# Patient Record
Sex: FEMALE | Race: WHITE | HISPANIC OR LATINO | URBAN - METROPOLITAN AREA
[De-identification: names, ages, dates, MRNs, and addresses within clinical notes are randomized per-mention and may not be internally consistent; named-entity substitution may affect disease eponyms.]

---

## 2019-09-24 ENCOUNTER — OFFICE VISIT (OUTPATIENT)
Dept: OBGYN CLINIC | Facility: CLINIC | Age: 34
End: 2019-09-24

## 2019-09-24 VITALS — WEIGHT: 270 LBS | HEART RATE: 62 BPM | SYSTOLIC BLOOD PRESSURE: 119 MMHG | DIASTOLIC BLOOD PRESSURE: 81 MMHG

## 2019-09-24 DIAGNOSIS — Z01.419 ENCOUNTER FOR ANNUAL ROUTINE GYNECOLOGICAL EXAMINATION: Primary | ICD-10-CM

## 2019-09-24 PROCEDURE — G0145 SCR C/V CYTO,THINLAYER,RESCR: HCPCS | Performed by: NURSE PRACTITIONER

## 2019-09-24 PROCEDURE — 87624 HPV HI-RISK TYP POOLED RSLT: CPT | Performed by: NURSE PRACTITIONER

## 2019-09-24 PROCEDURE — 99385 PREV VISIT NEW AGE 18-39: CPT | Performed by: NURSE PRACTITIONER

## 2019-09-24 NOTE — PROGRESS NOTES
Annual Exam    Assessment   1  Encounter for annual routine gynecological examination       well woman       Plan       All questions answered  Breast self exam technique reviewed and patient encouraged to perform self-exam monthly  Contraception: none  Discussed healthy lifestyle modifications  Follow up in 1 year  Thin prep Pap smear  Patient Instructions   PAP results will be available in 2 weeks  Call with needs or concerns  Return in 1 year or with positi testve pregnacy  Pt verbalized understanding of all discussed  Guadalupe Alberto is a 36 y o   female who presents for annual well woman exam  Periods are regular every 28-30 days, lasting 7 days  No intermenstrual bleeding, spotting, or discharge  1 partner x year, denies domestic   Desires pregnancy  Partner has a 2 year, pt had a miscarriage in   Discussed timing of intercourse, folic acid intake and use of an ovulation kit if desires  Pt has recently lost 40 lbs  Plans to call if she does not achieve a pregnancy in 6 months    Current contraception: none  History of abnormal Pap smear: yes - Unsure what it was in [place] 1 year ago  Family history of uterine or ovarian cancer: no  Regular self breast exam: yes  History of abnormal mammogram: N/A  Family history of breast cancer: no  History of abnormal lipids: Unknown  Menstrual History:  OB History        1    Para        Term                AB   1    Living           SAB   1    TAB        Ectopic        Multiple        Live Births                    Menarche age: 9  Patient's last menstrual period was 2019 (approximate)  The following portions of the patient's history were reviewed and updated as appropriate: allergies, current medications, past family history, past medical history, past social history, past surgical history and problem list     Review of Systems  Pertinent items are noted in HPI        Objective      /81   Pulse 62 Wt 122 kg (270 lb)   LMP 08/21/2019 (Approximate)   Breastfeeding?  No     General: alert and oriented, in no acute distress, alert, appears stated age and cooperative   Heart: regular rate and rhythm, S1, S2 normal, no murmur, click, rub or gallop   Lungs: clear to auscultation bilaterally   Thyroid: Negative masses   Abdomen: soft, non-tender, without masses or organomegaly   Vulva: normal   Vagina: normal mucosa   Cervix: no bleeding following Pap, no cervical motion tenderness and no lesions   Uterus: normal size, non-tender, normal shape and consistency   Adnexa: normal adnexa   Urethra: normal   Breasts: NT,negative masses, discharge, or dimpling

## 2019-09-24 NOTE — PATIENT INSTRUCTIONS
PAP results will be available in 2 weeks  Call with needs or concerns  Return in 1 year or with positi testve pregnacy

## 2019-09-25 LAB
HPV HR 12 DNA CVX QL NAA+PROBE: NEGATIVE
HPV16 DNA CVX QL NAA+PROBE: NEGATIVE
HPV18 DNA CVX QL NAA+PROBE: NEGATIVE

## 2019-09-27 LAB
LAB AP GYN PRIMARY INTERPRETATION: NORMAL
Lab: NORMAL

## 2021-01-25 ENCOUNTER — TELEPHONE (OUTPATIENT)
Dept: OBGYN CLINIC | Facility: CLINIC | Age: 36
End: 2021-01-25

## 2021-01-26 ENCOUNTER — TELEPHONE (OUTPATIENT)
Dept: OBGYN CLINIC | Facility: CLINIC | Age: 36
End: 2021-01-26

## 2021-04-08 ENCOUNTER — HOSPITAL ENCOUNTER (EMERGENCY)
Facility: HOSPITAL | Age: 36
Discharge: HOME | End: 2021-04-08
Attending: EMERGENCY MEDICINE
Payer: COMMERCIAL

## 2021-04-08 VITALS
DIASTOLIC BLOOD PRESSURE: 70 MMHG | HEART RATE: 60 BPM | RESPIRATION RATE: 18 BRPM | OXYGEN SATURATION: 97 % | SYSTOLIC BLOOD PRESSURE: 122 MMHG | TEMPERATURE: 97.4 F

## 2021-04-08 DIAGNOSIS — R07.89 CHEST WALL PAIN: Primary | ICD-10-CM

## 2021-04-08 LAB
ANION GAP SERPL CALCULATED.3IONS-SCNC: 7 MMOL/L (ref 4–13)
ATRIAL RATE: 70 BPM
BASOPHILS # BLD AUTO: 0.04 THOUSANDS/ΜL (ref 0–0.1)
BASOPHILS NFR BLD AUTO: 1 % (ref 0–1)
BUN SERPL-MCNC: 17 MG/DL (ref 5–25)
CALCIUM SERPL-MCNC: 9 MG/DL (ref 8.3–10.1)
CHLORIDE SERPL-SCNC: 104 MMOL/L (ref 100–108)
CO2 SERPL-SCNC: 30 MMOL/L (ref 21–32)
CREAT SERPL-MCNC: 0.84 MG/DL (ref 0.6–1.3)
EOSINOPHIL # BLD AUTO: 0.12 THOUSAND/ΜL (ref 0–0.61)
EOSINOPHIL NFR BLD AUTO: 2 % (ref 0–6)
ERYTHROCYTE [DISTWIDTH] IN BLOOD BY AUTOMATED COUNT: 12.3 % (ref 11.6–15.1)
GFR SERPL CREATININE-BSD FRML MDRD: 90 ML/MIN/1.73SQ M
GLUCOSE SERPL-MCNC: 88 MG/DL (ref 65–140)
HCT VFR BLD AUTO: 42.3 % (ref 34.8–46.1)
HGB BLD-MCNC: 13.5 G/DL (ref 11.5–15.4)
IMM GRANULOCYTES # BLD AUTO: 0.01 THOUSAND/UL (ref 0–0.2)
IMM GRANULOCYTES NFR BLD AUTO: 0 % (ref 0–2)
LYMPHOCYTES # BLD AUTO: 2.8 THOUSANDS/ΜL (ref 0.6–4.47)
LYMPHOCYTES NFR BLD AUTO: 41 % (ref 14–44)
MCH RBC QN AUTO: 28.7 PG (ref 26.8–34.3)
MCHC RBC AUTO-ENTMCNC: 31.9 G/DL (ref 31.4–37.4)
MCV RBC AUTO: 90 FL (ref 82–98)
MONOCYTES # BLD AUTO: 0.56 THOUSAND/ΜL (ref 0.17–1.22)
MONOCYTES NFR BLD AUTO: 8 % (ref 4–12)
NEUTROPHILS # BLD AUTO: 3.35 THOUSANDS/ΜL (ref 1.85–7.62)
NEUTS SEG NFR BLD AUTO: 48 % (ref 43–75)
NRBC BLD AUTO-RTO: 0 /100 WBCS
P AXIS: 38 DEGREES
PLATELET # BLD AUTO: 231 THOUSANDS/UL (ref 149–390)
PMV BLD AUTO: 10.7 FL (ref 8.9–12.7)
POTASSIUM SERPL-SCNC: 4 MMOL/L (ref 3.5–5.3)
PR INTERVAL: 142 MS
QRS AXIS: 30 DEGREES
QRSD INTERVAL: 72 MS
QT INTERVAL: 396 MS
QTC INTERVAL: 427 MS
RBC # BLD AUTO: 4.71 MILLION/UL (ref 3.81–5.12)
SODIUM SERPL-SCNC: 141 MMOL/L (ref 136–145)
T WAVE AXIS: 15 DEGREES
TROPONIN I SERPL-MCNC: <0.02 NG/ML
VENTRICULAR RATE: 70 BPM
WBC # BLD AUTO: 6.88 THOUSAND/UL (ref 4.31–10.16)

## 2021-04-08 PROCEDURE — 80048 BASIC METABOLIC PNL TOTAL CA: CPT | Performed by: PHYSICIAN ASSISTANT

## 2021-04-08 PROCEDURE — 99285 EMERGENCY DEPT VISIT HI MDM: CPT | Performed by: PHYSICIAN ASSISTANT

## 2021-04-08 PROCEDURE — 99285 EMERGENCY DEPT VISIT HI MDM: CPT

## 2021-04-08 PROCEDURE — 36415 COLL VENOUS BLD VENIPUNCTURE: CPT | Performed by: PHYSICIAN ASSISTANT

## 2021-04-08 PROCEDURE — 84484 ASSAY OF TROPONIN QUANT: CPT | Performed by: PHYSICIAN ASSISTANT

## 2021-04-08 PROCEDURE — 93010 ELECTROCARDIOGRAM REPORT: CPT | Performed by: INTERNAL MEDICINE

## 2021-04-08 PROCEDURE — 93005 ELECTROCARDIOGRAM TRACING: CPT

## 2021-04-08 PROCEDURE — 85025 COMPLETE CBC W/AUTO DIFF WBC: CPT | Performed by: PHYSICIAN ASSISTANT

## 2021-04-08 PROCEDURE — 96372 THER/PROPH/DIAG INJ SC/IM: CPT

## 2021-04-08 RX ORDER — METHOCARBAMOL 750 MG/1
750 TABLET, FILM COATED ORAL ONCE
Status: COMPLETED | OUTPATIENT
Start: 2021-04-08 | End: 2021-04-08

## 2021-04-08 RX ORDER — METHOCARBAMOL 500 MG/1
500 TABLET, FILM COATED ORAL 2 TIMES DAILY
Qty: 10 TABLET | Refills: 0 | Status: SHIPPED | OUTPATIENT
Start: 2021-04-08 | End: 2021-04-13

## 2021-04-08 RX ORDER — KETOROLAC TROMETHAMINE 30 MG/ML
30 INJECTION, SOLUTION INTRAMUSCULAR; INTRAVENOUS ONCE
Status: COMPLETED | OUTPATIENT
Start: 2021-04-08 | End: 2021-04-08

## 2021-04-08 RX ORDER — LIDOCAINE 50 MG/G
1 PATCH TOPICAL ONCE
Status: DISCONTINUED | OUTPATIENT
Start: 2021-04-08 | End: 2021-04-08 | Stop reason: HOSPADM

## 2021-04-08 RX ADMIN — KETOROLAC TROMETHAMINE 30 MG: 30 INJECTION, SOLUTION INTRAMUSCULAR; INTRAVENOUS at 02:15

## 2021-04-08 RX ADMIN — LIDOCAINE 1 PATCH: 50 PATCH TOPICAL at 02:17

## 2021-04-08 RX ADMIN — METHOCARBAMOL 750 MG: 750 TABLET, FILM COATED ORAL at 02:14

## 2021-04-08 NOTE — ED PROVIDER NOTES
History  Chief Complaint   Patient presents with    Chest Pain     pt reports chest tightness that radiates to back, started last night  states she also has pain/numbness in left arm  states last period was 38      44-year-old otherwise healthy female who presents to the emergency department for complaint of chest tightness starting approximately 3:00PM   Patient denies experiencing this pain before  She describes the pain as a tightness, localizes pain to the left side chest, radiating to the left shoulder, arm, and back, constant, worse with movements and turning over in bed, then becoming worse around 5PM while at work (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), took Tylenol with minimal relief  Denies fever or chills, pleuritic component, shortness of breath, abdominal pain, nausea vomiting, dizziness, near-syncope or syncope  Denies any past or present history of IV drug abuse, new exercise, chest trauma  No history of DVT or PE, recent long travel by car plane, recent surgery or immobilization, combination OCPs, unilateral calf pain, unilateral calf swelling or skin color changes  None       Past Medical History:   Diagnosis Date    Asthma     Depression        History reviewed  No pertinent surgical history  Family History   Problem Relation Age of Onset    Diabetes Mother     Hypertension Mother     Diabetes Father     Hypertension Father     Hyperlipidemia Father     Cancer Maternal Aunt     Cancer Maternal Uncle      I have reviewed and agree with the history as documented  E-Cigarette/Vaping     E-Cigarette/Vaping Substances     Social History     Tobacco Use    Smoking status: Never Smoker    Smokeless tobacco: Never Used   Substance Use Topics    Alcohol use: Yes     Frequency: Monthly or less     Drinks per session: 1 or 2     Binge frequency: Less than monthly    Drug use: Never       Review of Systems   Constitutional: Negative for chills, fatigue and fever  HENT: Negative for congestion, postnasal drip, rhinorrhea and sore throat  Respiratory: Negative for cough, chest tightness, shortness of breath and wheezing  Cardiovascular: Positive for chest pain  Negative for palpitations and leg swelling  Gastrointestinal: Negative for abdominal distention, abdominal pain, nausea and vomiting  Musculoskeletal: Positive for back pain  Negative for neck pain and neck stiffness  Skin: Negative for color change and rash  Neurological: Negative for dizziness, syncope, weakness, light-headedness, numbness and headaches  Hematological: Negative for adenopathy  All other systems reviewed and are negative  Physical Exam  Physical Exam  Vitals signs reviewed  Constitutional:       General: She is awake  She is not in acute distress  Appearance: Normal appearance  She is well-developed  She is not ill-appearing or toxic-appearing  HENT:      Head: Normocephalic and atraumatic  Mouth/Throat:      Lips: Pink  Mouth: Mucous membranes are moist       Pharynx: Oropharynx is clear  Uvula midline  Eyes:      Extraocular Movements: Extraocular movements intact  Conjunctiva/sclera: Conjunctivae normal       Pupils: Pupils are equal, round, and reactive to light  Neck:      Musculoskeletal: Normal range of motion and neck supple  Cardiovascular:      Rate and Rhythm: Normal rate and regular rhythm  Pulses: Normal pulses  Pulmonary:      Effort: Pulmonary effort is normal       Breath sounds: Normal breath sounds  Chest:      Chest wall: Tenderness present  No mass, deformity, swelling, crepitus or edema  Abdominal:      General: Bowel sounds are normal  There is no distension  Palpations: Abdomen is soft  Tenderness: There is no abdominal tenderness  Musculoskeletal: Normal range of motion  Skin:     General: Skin is warm  Capillary Refill: Capillary refill takes less than 2 seconds        Findings: No erythema, lesion or rash  Neurological:      Mental Status: She is alert and oriented to person, place, and time  Psychiatric:         Behavior: Behavior is cooperative           Vital Signs  ED Triage Vitals   Temperature Pulse Respirations Blood Pressure SpO2   04/08/21 0115 04/08/21 0112 04/08/21 0112 04/08/21 0112 04/08/21 0112   (!) 97 4 °F (36 3 °C) 69 18 156/88 97 %      Temp Source Heart Rate Source Patient Position - Orthostatic VS BP Location FiO2 (%)   04/08/21 0115 04/08/21 0112 04/08/21 0112 04/08/21 0112 --   Oral Monitor Sitting Left arm       Pain Score       04/08/21 0112       9           Vitals:    04/08/21 0112 04/08/21 0302   BP: 156/88 122/70   Pulse: 69 60   Patient Position - Orthostatic VS: Sitting Lying         Visual Acuity      ED Medications  Medications   ketorolac (TORADOL) injection 30 mg (30 mg Intramuscular Given 4/8/21 0215)   methocarbamol (ROBAXIN) tablet 750 mg (750 mg Oral Given 4/8/21 0214)       Diagnostic Studies  Results Reviewed     Procedure Component Value Units Date/Time    Troponin I [[id number]]  (Normal) Collected: 04/08/21 0222    Lab Status: Final result Specimen: Blood from Arm, Left Updated: 04/08/21 0249     Troponin I <0 02 ng/mL     Basic metabolic panel [[id number]] Collected: 04/08/21 0222    Lab Status: Final result Specimen: Blood from Arm, Left Updated: 04/08/21 0247     Sodium 141 mmol/L      Potassium 4 0 mmol/L      Chloride 104 mmol/L      CO2 30 mmol/L      ANION GAP 7 mmol/L      BUN 17 mg/dL      Creatinine 0 84 mg/dL      Glucose 88 mg/dL      Calcium 9 0 mg/dL      eGFR 90 ml/min/1 73sq m     Narrative:      Sisi guidelines for Chronic Kidney Disease (CKD):     Stage 1 with normal or high GFR (GFR > 90 mL/min/1 73 square meters)    Stage 2 Mild CKD (GFR = 60-89 mL/min/1 73 square meters)    Stage 3A Moderate CKD (GFR = 45-59 mL/min/1 73 square meters)    Stage 3B Moderate CKD (GFR = 30-44 mL/min/1 73 square meters)   Stage 4 Severe CKD (GFR = 15-29 mL/min/1 73 square meters)    Stage 5 End Stage CKD (GFR <15 mL/min/1 73 square meters)  Note: GFR calculation is accurate only with a steady state creatinine    CBC and differential [[id number]] Collected: 04/08/21 0222    Lab Status: Final result Specimen: Blood from Arm, Left Updated: 04/08/21 0230     WBC 6 88 Thousand/uL      RBC 4 71 Million/uL      Hemoglobin 13 5 g/dL      Hematocrit 42 3 %      MCV 90 fL      MCH 28 7 pg      MCHC 31 9 g/dL      RDW 12 3 %      MPV 10 7 fL      Platelets 045 Thousands/uL      nRBC 0 /100 WBCs      Neutrophils Relative 48 %      Immat GRANS % 0 %      Lymphocytes Relative 41 %      Monocytes Relative 8 %      Eosinophils Relative 2 %      Basophils Relative 1 %      Neutrophils Absolute 3 35 Thousands/µL      Immature Grans Absolute 0 01 Thousand/uL      Lymphocytes Absolute 2 80 Thousands/µL      Monocytes Absolute 0 56 Thousand/µL      Eosinophils Absolute 0 12 Thousand/µL      Basophils Absolute 0 04 Thousands/µL                  No orders to display              Procedures  Procedures         ED Course  ED Course as of Apr 08 0608   Thu Apr 08, 2021   0134 EKG shows normal sinus rhythm, rate 70, no acute ST segment elevation or depression, T-wave inversion in lead III, , QRS duration 72, peer interval 142, no arrhythmia or heart block      0301 Workup unremarkable  Discussed supportive care measures for musculoskeletal chest wall pain including anti-inflammatories p r n , Robaxin p r n , heating pad, rest and avoidance of any aggravating activities                  HEART Risk Score      Most Recent Value   Heart Score Risk Calculator   History  0 Filed at: 04/08/2021 0256   ECG  0 Filed at: 04/08/2021 0256   Age  0 Filed at: 04/08/2021 0256   Risk Factors  0 Filed at: 04/08/2021 0256   Troponin  0 Filed at: 04/08/2021 0256   HEART Score  0 Filed at: 04/08/2021 0256                      SBIRT 22yo+      Most Recent Value   SBIRT (21 yo +)   In order to provide better care to our patients, we are screening all of our patients for alcohol and drug use  Would it be okay to ask you these screening questions? Yes Filed at: 04/08/2021 0123   Initial Alcohol Screen: US AUDIT-C    1  How often do you have a drink containing alcohol? 2 Filed at: 04/08/2021 0123   2  How many drinks containing alcohol do you have on a typical day you are drinking? 1 Filed at: 04/08/2021 0123   3b  FEMALE Any Age, or MALE 65+: How often do you have 4 or more drinks on one occassion? 0 Filed at: 04/08/2021 0123   Audit-C Score  3 Filed at: 04/08/2021 2516   ALLEN: How many times in the past year have you    Used an illegal drug or used a prescription medication for non-medical reasons? Never Filed at: 04/08/2021 0123                    MDM  Number of Diagnoses or Management Options  Chest wall pain:   Diagnosis management comments: On exam, well-appearing female, no acute distress, nontoxic appearance, afebrile, vitals unremarkable, awake alert and oriented, largely producible and exquisite tenderness of the sternum and left side chest such that patient winces and withdraws from touch, lungs clear to auscultation bilaterally, no heart murmur, abdomen soft and nontender, no distension, remainder of exam unremarkable as above  As pain is largely reproducible on exam and patient reports worsening pain with movement and at work, suspicion for cardiac origin of pain is low  Will check labs, EKG for any ischemic changes, arrhythmia, or block  Will administer Toradol, Robaxin, and lidocaine and reassess         Amount and/or Complexity of Data Reviewed  Clinical lab tests: ordered and reviewed  Tests in the medicine section of CPT®: ordered and reviewed  Decide to obtain previous medical records or to obtain history from someone other than the patient: yes  Obtain history from someone other than the patient: yes  Review and summarize past medical records: yes  Discuss the patient with other providers: yes    Patient Progress  Patient progress: improved (See ED course note for dispo and plan  I reviewed and discussed all lab findings with the patient at bedside  I discussed emergency department return parameters  I answered any and all questions the patient had regarding emergency department course of evaluation and treatment  The patient verbalized understanding of and agreement with plan   )      Disposition  Final diagnoses:   Chest wall pain     Time reflects when diagnosis was documented in both MDM as applicable and the Disposition within this note     Time User Action Codes Description Comment    4/8/2021  2:57 AM Jaspreet Hill Add [R07 89] Chest wall pain       ED Disposition     ED Disposition Condition Date/Time Comment    Discharge Stable Thu Apr 8, 2021  2:56 AM Yen Paulino discharge to home/self care  Follow-up Information     Follow up With Specialties Details Why Contact Info Additional [address] [facility] Emergency Department Emergency Medicine Go to  If symptoms worsen [facility] [id number]  [address] [place] [address] Emergency Department, [address] [place] [address], [address] Schedule an appointment as soon as possible for a visit in 1 day For further evaluation [facility] [id number]  [address] [place] [address], [address] [place] [address], [address] [place] [address]          Discharge Medication List as of 4/8/2021  3:01 AM      START taking these medications    Details   methocarbamol (ROBAXIN) 500 mg tablet Take 1 tablet (500 mg total) by mouth 2 (two) times a day for 5 days, Starting Thu 4/8/2021, Until Tue 4/13/2021, Normal           No discharge procedures on file      PDMP Review     None          ED Provider  Electronically Signed by           Shweta Valles DAWOOD  04/08/21 2862

## 2021-04-08 NOTE — Clinical Note
Eva Krishnamurthy was seen and treated in our emergency department on 4/8/2021  light duty    Diagnosis: chest wall pain    Serina  may return to work on return date  She may return on this date: 04/09/2021         If you have any questions or concerns, please don't hesitate to call        Andrea Cote PA-C    ______________________________           _______________          _______________  Hospital Representative                              Date                                Time

## 2022-09-04 ENCOUNTER — APPOINTMENT (OUTPATIENT)
Dept: RADIOLOGY | Facility: HOSPITAL | Age: 37
End: 2022-09-04
Payer: COMMERCIAL

## 2022-09-04 ENCOUNTER — HOSPITAL ENCOUNTER (EMERGENCY)
Facility: HOSPITAL | Age: 37
Discharge: HOME | End: 2022-09-04
Attending: EMERGENCY MEDICINE
Payer: COMMERCIAL

## 2022-09-04 VITALS
SYSTOLIC BLOOD PRESSURE: 142 MMHG | OXYGEN SATURATION: 99 % | DIASTOLIC BLOOD PRESSURE: 75 MMHG | HEIGHT: 68 IN | TEMPERATURE: 98.5 F | BODY MASS INDEX: 41.05 KG/M2 | HEART RATE: 83 BPM | RESPIRATION RATE: 18 BRPM

## 2022-09-04 DIAGNOSIS — M54.6 ACUTE MIDLINE THORACIC BACK PAIN: Primary | ICD-10-CM

## 2022-09-04 PROCEDURE — 99284 EMERGENCY DEPT VISIT MOD MDM: CPT | Performed by: EMERGENCY MEDICINE

## 2022-09-04 PROCEDURE — 72070 X-RAY EXAM THORAC SPINE 2VWS: CPT

## 2022-09-04 PROCEDURE — 72100 X-RAY EXAM L-S SPINE 2/3 VWS: CPT

## 2022-09-04 PROCEDURE — 99283 EMERGENCY DEPT VISIT LOW MDM: CPT

## 2022-09-04 NOTE — Clinical Note
Jaja Amaya was seen and treated in our emergency department on 9/4/2022  No restrictions            Diagnosis:     Gertrudis Monge  may return to work on return date  She may return on this date: 09/06/2022         If you have any questions or concerns, please don't hesitate to call        Lewis Lubin MD    ______________________________           _______________          _______________  Hospital Representative                              Date                                Time

## 2022-09-05 NOTE — ED PROVIDER NOTES
History  Chief Complaint   Patient presents with    Back Pain     Patient reports pain to middle of back for 2 weeks, occasional radiation of pain to both upper and lower back  Reports fall at work 1 month ago  76-year-old female, presenting with back pain  Patient reports pain in mid back for the past 2 weeks  Pain worse with movement and radiates down back in to bilateral legs  Patient states she fell several weeks ago at work, unsure she injured at that time  Denies any weakness in legs, no fevers, no difficulty with urination or bowel movements  No shortness of breath or chest pain  History provided by:  Patient   used: No    Back Pain  Associated symptoms: no abdominal pain and no fever        Prior to Admission Medications   Prescriptions Last Dose Informant Patient Reported? Taking? methocarbamol (ROBAXIN) 500 mg tablet   No No   Sig: Take 1 tablet (500 mg total) by mouth 2 (two) times a day for 5 days      Facility-Administered Medications: None       Past Medical History:   Diagnosis Date    Asthma     Depression        Past Surgical History:   Procedure Laterality Date    SLEEVE GASTROPLASTY         Family History   Problem Relation Age of Onset    Diabetes Mother     Hypertension Mother     Diabetes Father     Hypertension Father     Hyperlipidemia Father     Cancer Maternal Aunt     Cancer Maternal Uncle      I have reviewed and agree with the history as documented  E-Cigarette/Vaping     E-Cigarette/Vaping Substances     Social History     Tobacco Use    Smoking status: Never Smoker    Smokeless tobacco: Never Used   Substance Use Topics    Alcohol use: Yes     Comment: social    Drug use: Never       Review of Systems   Constitutional: Negative  Negative for fever  Respiratory: Negative  Cardiovascular: Negative  Gastrointestinal: Negative  Negative for abdominal pain  Genitourinary: Negative  Musculoskeletal: Positive for back pain  Neurological: Negative  Physical Exam  Physical Exam  Vitals and nursing note reviewed  Constitutional:       General: She is not in acute distress  HENT:      Head: Normocephalic and atraumatic  Cardiovascular:      Rate and Rhythm: Normal rate  Pulmonary:      Effort: Pulmonary effort is normal    Abdominal:      Palpations: Abdomen is soft  Tenderness: There is no abdominal tenderness  Musculoskeletal:         General: Normal range of motion  Back:       Comments: Tenderness to lower thoracic, upper lumbar spinal area, no swelling no deformity  Skin:     General: Skin is warm and dry  Neurological:      General: No focal deficit present  Mental Status: She is alert and oriented to person, place, and time  Motor: No weakness  Comments: 5/5 strength all 4 extremities, normal gait         Vital Signs  ED Triage Vitals [09/04/22 2201]   Temperature Pulse Respirations Blood Pressure SpO2   98 5 °F (36 9 °C) 83 18 142/75 99 %      Temp Source Heart Rate Source Patient Position - Orthostatic VS BP Location FiO2 (%)   Oral Monitor Sitting Right arm --      Pain Score       8           Vitals:    09/04/22 2201   BP: 142/75   Pulse: 83   Patient Position - Orthostatic VS: Sitting         Visual Acuity      ED Medications  Medications - No data to display    Diagnostic Studies  Results Reviewed     None                 XR thoracic spine 2 views    (Results Pending)   XR lumbar spine 2 or 3 views    (Results Pending)              Procedures  Procedures         ED Course  ED Course as of 09/04/22 2325   Sun Sep 04, 2022   2315 X-ray lumbar and thoracic spine reviewed by myself, no acute fracture, no acute findings  2325 Patient able to ambulate in ED with  Ambulatory referral for comprehensive spine PT order placed                                               MDM  Number of Diagnoses or Management Options  Diagnosis management comments: 71-year-old female, presenting with back pain  Differential diagnosis includes back strain, contusion, compression fracture among other diagnosis  X-rays ordered  Amount and/or Complexity of Data Reviewed  Tests in the radiology section of CPT®: ordered and reviewed  Independent visualization of images, tracings, or specimens: yes        Disposition  Final diagnoses:   Acute midline thoracic back pain     Time reflects when diagnosis was documented in both MDM as applicable and the Disposition within this note     Time User Action Codes Description Comment    9/4/2022 11:17 PM Katlyn Madden Add [M54 6] Acute midline thoracic back pain       ED Disposition     ED Disposition   Discharge    Condition   Stable    Date/Time   Sun Sep 4, 2022 11:17 PM    [address] [facility] discharge to home/self care                 Follow-up Information     Follow up With Specialties Details Why Contact Info Additional Information    [address] [facility]   [phone]          Discharge Medication List as of 9/4/2022 11:19 PM      CONTINUE these medications which have NOT CHANGED    Details   methocarbamol (ROBAXIN) 500 mg tablet Take 1 tablet (500 mg total) by mouth 2 (two) times a day for 5 days, Starting Thu 4/8/2021, Until Tue 4/13/2021, Normal                 PDMP Review     None          ED Provider  Electronically Signed by           Branden Henley MD  09/04/22 0083

## 2022-10-03 ENCOUNTER — HOSPITAL ENCOUNTER (EMERGENCY)
Facility: HOSPITAL | Age: 37
Discharge: HOME | End: 2022-10-03
Attending: EMERGENCY MEDICINE
Payer: COMMERCIAL

## 2022-10-03 ENCOUNTER — APPOINTMENT (OUTPATIENT)
Dept: ULTRASOUND IMAGING | Facility: HOSPITAL | Age: 37
End: 2022-10-03
Payer: COMMERCIAL

## 2022-10-03 VITALS
DIASTOLIC BLOOD PRESSURE: 72 MMHG | WEIGHT: 235.23 LBS | TEMPERATURE: 98.4 F | RESPIRATION RATE: 18 BRPM | BODY MASS INDEX: 35.77 KG/M2 | SYSTOLIC BLOOD PRESSURE: 111 MMHG | HEART RATE: 56 BPM | OXYGEN SATURATION: 100 %

## 2022-10-03 DIAGNOSIS — Z3A.01 LESS THAN 8 WEEKS GESTATION OF PREGNANCY: ICD-10-CM

## 2022-10-03 DIAGNOSIS — R10.9 ABDOMINAL CRAMPING: Primary | ICD-10-CM

## 2022-10-03 LAB
ABO GROUP BLD: NORMAL
B-HCG SERPL-ACNC: ABNORMAL MIU/ML
BILIRUB UR QL STRIP: NEGATIVE
CLARITY UR: CLEAR
COLOR UR: YELLOW
EXT PREG TEST URINE: POSITIVE
EXT. CONTROL ED NAV: ABNORMAL
GLUCOSE UR STRIP-MCNC: NEGATIVE MG/DL
HGB UR QL STRIP.AUTO: NEGATIVE
KETONES UR STRIP-MCNC: NEGATIVE MG/DL
LEUKOCYTE ESTERASE UR QL STRIP: NEGATIVE
NITRITE UR QL STRIP: NEGATIVE
PH UR STRIP.AUTO: 6 [PH]
PROT UR STRIP-MCNC: NEGATIVE MG/DL
RH BLD: POSITIVE
SP GR UR STRIP.AUTO: 1.02 (ref 1–1.03)
UROBILINOGEN UR QL STRIP.AUTO: 0.2 E.U./DL

## 2022-10-03 PROCEDURE — 81003 URINALYSIS AUTO W/O SCOPE: CPT | Performed by: EMERGENCY MEDICINE

## 2022-10-03 PROCEDURE — 86901 BLOOD TYPING SEROLOGIC RH(D): CPT | Performed by: EMERGENCY MEDICINE

## 2022-10-03 PROCEDURE — 96374 THER/PROPH/DIAG INJ IV PUSH: CPT

## 2022-10-03 PROCEDURE — 87086 URINE CULTURE/COLONY COUNT: CPT | Performed by: EMERGENCY MEDICINE

## 2022-10-03 PROCEDURE — 81025 URINE PREGNANCY TEST: CPT | Performed by: EMERGENCY MEDICINE

## 2022-10-03 PROCEDURE — 87591 N.GONORRHOEAE DNA AMP PROB: CPT | Performed by: EMERGENCY MEDICINE

## 2022-10-03 PROCEDURE — 84702 CHORIONIC GONADOTROPIN TEST: CPT | Performed by: EMERGENCY MEDICINE

## 2022-10-03 PROCEDURE — 86900 BLOOD TYPING SEROLOGIC ABO: CPT | Performed by: EMERGENCY MEDICINE

## 2022-10-03 PROCEDURE — 87491 CHLMYD TRACH DNA AMP PROBE: CPT | Performed by: EMERGENCY MEDICINE

## 2022-10-03 PROCEDURE — 76801 OB US < 14 WKS SINGLE FETUS: CPT

## 2022-10-03 PROCEDURE — 36415 COLL VENOUS BLD VENIPUNCTURE: CPT | Performed by: EMERGENCY MEDICINE

## 2022-10-03 PROCEDURE — 99282 EMERGENCY DEPT VISIT SF MDM: CPT | Performed by: EMERGENCY MEDICINE

## 2022-10-03 PROCEDURE — 99284 EMERGENCY DEPT VISIT MOD MDM: CPT

## 2022-10-03 RX ORDER — ACETAMINOPHEN 325 MG/1
650 TABLET ORAL ONCE
Status: COMPLETED | OUTPATIENT
Start: 2022-10-03 | End: 2022-10-03

## 2022-10-03 RX ORDER — ONDANSETRON 2 MG/ML
4 INJECTION INTRAMUSCULAR; INTRAVENOUS ONCE
Status: COMPLETED | OUTPATIENT
Start: 2022-10-03 | End: 2022-10-03

## 2022-10-03 RX ADMIN — ACETAMINOPHEN 650 MG: 325 TABLET ORAL at 20:58

## 2022-10-03 RX ADMIN — ONDANSETRON 4 MG: 2 INJECTION INTRAMUSCULAR; INTRAVENOUS at 20:58

## 2022-10-04 LAB
C TRACH DNA SPEC QL NAA+PROBE: NEGATIVE
N GONORRHOEA DNA SPEC QL NAA+PROBE: NEGATIVE

## 2022-10-04 NOTE — ED PROVIDER NOTES
History  Chief Complaint   Patient presents with    Vaginal Itching     Reports vaginal itching that began x3 days ago  Denies vaginal discharge  Reports burning with urination   Abdominal Cramping     Patient reports bilateral lower quadrant abdominal cramping that began yesterday  Reports worsening cramping when sitting  ~7 weeks pregnant  Denies Vaginal bleeding or discharge  HPI    41-year-old female, , 7 weeks pregnant by last known menstrual period, presenting for evaluation of dysuria and lower abdominal cramping  Dysuria started 3 days ago and is associated with some vaginal itching  Lower abdominal cramping began 1 day ago  Cannot recall any worsening or relieving factors  It is associated with nausea without vomiting  Patient has not had any OB follow-up yet, just found out that she was pregnant last week via a home pregnancy test   Admits to history of 1 spontaneous miscarriage at approximately 4 weeks  Denies vaginal bleeding, vaginal discharge, chest pain, shortness of breath, fever, chills, numbness, weakness, flank pain  Prior to Admission Medications   Prescriptions Last Dose Informant Patient Reported? Taking? methocarbamol (ROBAXIN) 500 mg tablet   No No   Sig: Take 1 tablet (500 mg total) by mouth 2 (two) times a day for 5 days      Facility-Administered Medications: None       Past Medical History:   Diagnosis Date    Asthma     Depression        Past Surgical History:   Procedure Laterality Date    SLEEVE GASTROPLASTY         Family History   Problem Relation Age of Onset    Diabetes Mother     Hypertension Mother     Diabetes Father     Hypertension Father     Hyperlipidemia Father     Cancer Maternal Aunt     Cancer Maternal Uncle      I have reviewed and agree with the history as documented      E-Cigarette/Vaping    E-Cigarette Use Never User      E-Cigarette/Vaping Substances    Nicotine No     THC No     CBD No     Flavoring No     Other No     Unknown No      Social History     Tobacco Use    Smoking status: Never Smoker    Smokeless tobacco: Never Used   Vaping Use    Vaping Use: Never used   Substance Use Topics    Alcohol use: Not Currently     Comment: social    Drug use: Never        Review of Systems   Constitutional: Negative for chills and fever  HENT: Negative for sore throat  Respiratory: Negative for cough and shortness of breath  Cardiovascular: Negative for chest pain, palpitations and leg swelling  Gastrointestinal: Positive for abdominal pain and nausea  Negative for diarrhea and vomiting  Genitourinary: Positive for dysuria  Negative for frequency, vaginal bleeding and vaginal discharge  Musculoskeletal: Negative for myalgias  Skin: Negative for rash and wound  Neurological: Negative for dizziness, light-headedness and headaches  All other systems reviewed and are negative  Physical Exam  ED Triage Vitals [10/03/22 1950]   Temperature Pulse Respirations Blood Pressure SpO2   98 4 °F (36 9 °C) 72 17 137/65 100 %      Temp Source Heart Rate Source Patient Position - Orthostatic VS BP Location FiO2 (%)   Oral Monitor Sitting Right arm --      Pain Score       7             Orthostatic Vital Signs  Vitals:    10/03/22 1950 10/03/22 2204   BP: 137/65 111/72   Pulse: 72 56   Patient Position - Orthostatic VS: Sitting Lying       Physical Exam  Vitals and nursing note reviewed  Constitutional:       General: She is not in acute distress  Appearance: Normal appearance  She is not ill-appearing or toxic-appearing  HENT:      Head: Normocephalic and atraumatic  Right Ear: External ear normal       Left Ear: External ear normal       Nose: Nose normal       Mouth/Throat:      Mouth: Mucous membranes are moist       Pharynx: Oropharynx is clear  Eyes:      General: No scleral icterus  Extraocular Movements: Extraocular movements intact     Cardiovascular:      Rate and Rhythm: Normal rate and regular rhythm  Pulses: Normal pulses  Pulmonary:      Effort: Pulmonary effort is normal  No respiratory distress  Breath sounds: Normal breath sounds  Abdominal:      Palpations: Abdomen is soft  Tenderness: There is no abdominal tenderness  There is no guarding or rebound  Musculoskeletal:         General: Normal range of motion  Cervical back: Normal range of motion and neck supple  Skin:     General: Skin is warm  Capillary Refill: Capillary refill takes less than 2 seconds  Neurological:      General: No focal deficit present  Mental Status: She is alert and oriented to person, place, and time           ED Medications  Medications   acetaminophen (TYLENOL) tablet 650 mg (650 mg Oral Given 10/3/22 2058)   ondansetron (ZOFRAN) injection 4 mg (4 mg Intravenous Given 10/3/22 2058)       Diagnostic Studies  Results Reviewed     Procedure Component Value Units Date/Time    Chlamydia/GC amplified DNA by PCR [[id number]] Collected: 10/03/22 2302    Lab Status: No result Specimen: Urine, Other     hCG, quantitative [[id number]]  (Abnormal) Collected: 10/03/22 2052    Lab Status: Final result Specimen: Blood from Arm, Right Updated: 10/03/22 2150     HCG, Quant 70,138 3 mIU/mL     Narrative:       Expected Ranges:     Approximate               Approximate HCG  Gestation age          Concentration ( mIU/mL)  _____________          ______________________   Ronny Nurse                      HCG values  0 2-1                       5-50  1-2                           2-3                         100-5000  3-4                         500-36601  4-5                         1000-88391  5-6                         [id number]  6-8                         [id number]  8-12                        [id number]      UA w Reflex to Microscopic w Reflex to Culture [[id number]] Collected: 10/03/22 2053    Lab Status: Final result Specimen: Urine, Other Updated: 10/03/22 2112     Color, UA Yellow     Clarity, UA Clear     Specific [place], UA 1 020     pH, UA 6 0     Leukocytes, UA Negative     Nitrite, UA Negative     Protein, UA Negative mg/dl      Glucose, UA Negative mg/dl      Ketones, UA Negative mg/dl      Urobilinogen, UA 0 2 E U /dl      Bilirubin, UA Negative     Occult Blood, UA Negative     URINE COMMENT --    Urine culture [[id number]] Collected: 10/03/22 2053    Lab Status: In process Specimen: Urine, Other Updated: 10/03/22 2112    POCT pregnancy, urine [[id number]]  (Abnormal) Resulted: 10/03/22 2058    Lab Status: Final result Updated: 10/03/22 2058     EXT PREG TEST UR (Ref: Negative) positive     Control valid                 US OB < 14 weeks with transvaginal   Final Result by Ariadna Dietz MD (10/03 2302)      Single live intrauterine gestation at 7 weeks 6 days (range +/- 4)  EMANUEL of 5/16/2023  Workstation performed: [id number]               Procedures  Procedures      ED Course  ED Course as of 10/03/22 2319   Mon Oct 03, 2022   2152 Rh Factor: Positive  No rhogam required  SBIRT 20yo+    Flowsheet Row Most Recent Value   SBIRT (25 yo +)    In order to provide better care to our patients, we are screening all of our patients for alcohol and drug use  Would it be okay to ask you these screening questions? No Filed at: 10/03/2022 2111                [facility]  Number of Diagnoses or Management Options  Abdominal cramping  Less than 8 weeks gestation of pregnancy  Diagnosis management comments: 69-year-old female presenting for evaluation of lower abdominal cramping, dysuria in the setting of pregnancy  Denies any vaginal discharge or vaginal bleeding  My differential diagnosis includes but is not limited to ectopic pregnancy, threatened miscarriage, UTI  Plan:  Quantitative beta HCG, UA, GC, transvaginal ultrasound  Although the patient denies vaginal bleeding, will check Rh factor  Patient's UA does not suggest infection    Transvaginal ultrasound confirmed intrauterine pregnancy  Discussed these results with the patient and her , gave anticipatory guidance, advised him to follow-up with OB and gave strict return to ED precautions  All questions were answered at this time  I reviewed all testing with the patient: see above  I gave oral return precautions for what to return for in addition to the written return precautions  The patient (and any family present: ) verbalized understanding of the discharge instructions and warnings that would necessitate return to the Emergency Department  I specifically highlighted areas of special concern regarding the written and verbal discharge instructions and return precautions  All questions were answered prior to discharge  Disposition  Final diagnoses:   Abdominal cramping   Less than 8 weeks gestation of pregnancy     Time reflects when diagnosis was documented in both MDM as applicable and the Disposition within this note     Time User Action Codes Description Comment    10/3/2022 11:05 PM Marty Mcdonald Add [R10 9] Abdominal cramping     10/3/2022 11:05 PM Marty Mcdonald Add [Z3A 01] Less than 8 weeks gestation of pregnancy       ED Disposition     ED Disposition   Discharge    Condition   Stable    Date/Time   Mon Oct 3, 2022 11:05 PM    Comment   Kailyn Manning discharge to home/self care                 Follow-up Information     Follow up With Specialties Details Why Contact Info Additional [address] Obstetrics and Gynecology  For Emergency Department Follow-up [address] [address] [place] [address], [phone]   [facility] Emergency Department Emergency Medicine  If symptoms worsen [facility] [id number]  [address] [place] [address] Emergency Department, [address] [facility] [address]  , [place], [address] [facility], [address]          Discharge Medication List as of 10/3/2022 11:06 PM      CONTINUE these medications which have NOT CHANGED    Details   methocarbamol (ROBAXIN) 500 mg tablet Take 1 tablet (500 mg total) by mouth 2 (two) times a day for 5 days, Starting Thu 4/8/2021, Until Tue 4/13/2021, Normal           No discharge procedures on file  PDMP Review     None           ED Provider  Attending physically available and evaluated Dane Davis I managed the patient along with the ED Attending      Electronically Signed by         Andrew Zhang DO  10/03/22 4914

## 2022-10-04 NOTE — ED ATTENDING ATTESTATION
10/3/2022  Marie AGUILA, DO, saw and evaluated the patient  I have discussed the patient with the resident/non-physician practitioner and agree with the resident's/non-physician practitioner's findings, Plan of Care, and MDM as documented in the resident's/non-physician practitioner's note, except where noted  All available labs and Radiology studies were reviewed  I was present for key portions of any procedure(s) performed by the resident/non-physician practitioner and I was immediately available to provide assistance  At this point I agree with the current assessment done in the Emergency Department  I have conducted an independent evaluation of this patient a history and physical is as follows:    41 yo F  7 weeks pregnant by LMP presenting with dysuria and lower abdominal cramping  Pt states dysuria started 3 days ago  B/l lower abdominal cramping started yesterday  No a/e factors  Nausea without vomiting  Denies CP, SOB, cough, URI sx, changes in stool, vaginal discharge/bleeding     Has not seen OB yet this pregnancy  H/o 1 spontaneous miscarriage at about 4 weeks    MDM: 41 yo F pregnant with abdominal cramping/dysuria- UA to r/o UTI, hcg quant, US to r/o ectopic/eval for IUP, dispo pending workup    ED Course         Critical Care Time  Procedures

## 2022-10-04 NOTE — DISCHARGE INSTRUCTIONS
Your ultrasound showed "Single live intrauterine gestation at 7 weeks 6 days (range +/- 4) "    Follow up with OB  Return to the ED if you develop any of the concerning symptoms we discussed  You can take acetaminophen 650 mg every 6 hours as needed for your cramping

## 2022-10-05 LAB — BACTERIA UR CULT: NORMAL

## 2022-11-27 ENCOUNTER — HOSPITAL ENCOUNTER (EMERGENCY)
Facility: HOSPITAL | Age: 37
Discharge: HOME | End: 2022-11-27
Attending: EMERGENCY MEDICINE

## 2022-11-27 VITALS
WEIGHT: 237.22 LBS | HEART RATE: 76 BPM | SYSTOLIC BLOOD PRESSURE: 138 MMHG | BODY MASS INDEX: 36.07 KG/M2 | DIASTOLIC BLOOD PRESSURE: 71 MMHG | RESPIRATION RATE: 18 BRPM | OXYGEN SATURATION: 100 % | TEMPERATURE: 97.9 F

## 2022-11-27 DIAGNOSIS — Z34.90 PREGNANCY: Primary | ICD-10-CM

## 2022-11-27 DIAGNOSIS — M54.10 RADICULOPATHY OF LEG: ICD-10-CM

## 2022-11-27 DIAGNOSIS — M54.50 ACUTE LOW BACK PAIN: ICD-10-CM

## 2022-11-27 RX ORDER — ACETAMINOPHEN 325 MG/1
975 TABLET ORAL ONCE
Status: COMPLETED | OUTPATIENT
Start: 2022-11-27 | End: 2022-11-27

## 2022-11-27 RX ORDER — FENTANYL CITRATE 50 UG/ML
INJECTION, SOLUTION INTRAMUSCULAR; INTRAVENOUS
Status: DISCONTINUED
Start: 2022-11-27 | End: 2022-11-27 | Stop reason: HOSPADM

## 2022-11-27 RX ORDER — ACETAMINOPHEN 500 MG
500 TABLET ORAL EVERY 6 HOURS PRN
Qty: 30 TABLET | Refills: 0 | Status: SHIPPED | OUTPATIENT
Start: 2022-11-27

## 2022-11-27 RX ADMIN — ACETAMINOPHEN 975 MG: 325 TABLET ORAL at 20:16

## 2022-11-28 NOTE — ED NOTES
This RN noticed that an Ronakn Stare overrode a dose of Fentanyl for this patient  Hugh Goddard Texted and questioned why this action was done   No answer at this time     Lorrie Norton RN  11/27/22 2001

## 2022-11-28 NOTE — ED NOTES
Mer Chin More responded through [facility] stating, "We have a labor patient here in 19 was last name Yoanna Metz  I believe I took the medicine out under the wrong Yoanna Metz " I asked if he was going to return it under my patient due to the medication incident now   He replied, "Yes "     Verna Eagle, RN  11/27/22 2019

## 2022-11-28 NOTE — ED PROVIDER NOTES
History  Chief Complaint   Patient presents with   • Back Pain     Patient reports she is having lower back pain that radiates down left leg since Friday  Denies injury/trauma  Patient is approx 15 weeks pregnant but denies all pregnancy related complaints - denies vaginal bleeding, discharge, cramping, etc         History provided by:  Patient and spouse  Back Pain  Location:  Lumbar spine (Para to the left leg and pelvis)  Radiates to: Left anterior thigh, left calf  Pain severity:  Moderate  Onset quality:  Gradual  Timing:  Constant  Progression:  Worsening  Chronicity:  New  Worsened by:  Nothing  Ineffective treatments:  None tried  Associated symptoms: leg pain    Associated symptoms: no abdominal pain, no abdominal swelling, no bladder incontinence, no bowel incontinence, no chest pain, no dysuria, no fever, no numbness, no pelvic pain, no perianal numbness and no weakness    Risk factors: pregnancy        Prior to Admission Medications   Prescriptions Last Dose Informant Patient Reported? Taking? methocarbamol (ROBAXIN) 500 mg tablet   No No   Sig: Take 1 tablet (500 mg total) by mouth 2 (two) times a day for 5 days      Facility-Administered Medications: None       Past Medical History:   Diagnosis Date   • Asthma    • Depression        Past Surgical History:   Procedure Laterality Date   • SLEEVE GASTROPLASTY         Family History   Problem Relation Age of Onset   • Diabetes Mother    • Hypertension Mother    • Diabetes Father    • Hypertension Father    • Hyperlipidemia Father    • Cancer Maternal Aunt    • Cancer Maternal Uncle      I have reviewed and agree with the history as documented      E-Cigarette/Vaping   • E-Cigarette Use Never User      E-Cigarette/Vaping Substances   • Nicotine No    • THC No    • CBD No    • Flavoring No    • Other No    • Unknown No      Social History     Tobacco Use   • Smoking status: Never   • Smokeless tobacco: Never   Vaping Use   • Vaping Use: Never used Substance Use Topics   • Alcohol use: Not Currently     Comment: social   • Drug use: Never       Review of Systems   Constitutional: Negative for fever  Respiratory: Negative for shortness of breath  Cardiovascular: Negative for chest pain  Gastrointestinal: Negative for abdominal distention, abdominal pain, bowel incontinence and rectal pain  Genitourinary: Negative for bladder incontinence, difficulty urinating, dysuria, flank pain, frequency, hematuria, pelvic pain, vaginal bleeding, vaginal discharge and vaginal pain  Musculoskeletal: Positive for back pain  Skin: Negative for wound  Neurological: Negative for weakness and numbness  All other systems reviewed and are negative  Physical Exam  Physical Exam  Constitutional:       Appearance: Normal appearance  She is obese  HENT:      Head: Normocephalic  Right Ear: External ear normal       Left Ear: External ear normal       Nose: Nose normal       Mouth/Throat:      Pharynx: Oropharynx is clear  Eyes:      Conjunctiva/sclera: Conjunctivae normal    Cardiovascular:      Rate and Rhythm: Normal rate  Pulses: Normal pulses  Pulmonary:      Effort: Pulmonary effort is normal    Abdominal:      General: There is no distension  Tenderness: There is no abdominal tenderness  There is no guarding  Musculoskeletal:         General: Tenderness present  Normal range of motion  Cervical back: Normal range of motion and neck supple  No tenderness  Skin:     General: Skin is warm and dry  Capillary Refill: Capillary refill takes less than 2 seconds  Findings: No rash  Neurological:      General: No focal deficit present  Mental Status: She is alert and oriented to person, place, and time  Sensory: No sensory deficit  Motor: No weakness  Coordination: Coordination normal       Gait: Gait normal       Deep Tendon Reflexes: Reflexes normal  Babinski sign absent on the right side   Babinski sign absent on the left side  Reflex Scores:       Patellar reflexes are 2+ on the right side and 2+ on the left side  Achilles reflexes are 1+ on the right side and 1+ on the left side  Psychiatric:         Mood and Affect: Mood normal          Vital Signs  ED Triage Vitals [11/27/22 1901]   Temperature Pulse Respirations Blood Pressure SpO2   97 9 °F (36 6 °C) 76 18 138/71 100 %      Temp Source Heart Rate Source Patient Position - Orthostatic VS BP Location FiO2 (%)   Oral Monitor Sitting Right arm --      Pain Score       9           Vitals:    11/27/22 1901   BP: 138/71   Pulse: 76   Patient Position - Orthostatic VS: Sitting         Visual Acuity      ED Medications  Medications   acetaminophen (TYLENOL) tablet 975 mg (975 mg Oral Given 11/27/22 2016)       Diagnostic Studies  Results Reviewed     None                 No orders to display              Procedures  Procedures         ED Course                                             MDM  Number of Diagnoses or Management Options  Acute low back pain  Pregnancy  Radiculopathy of leg  Diagnosis management comments: 73-year-old female (15 weeks pregnant) presents emergency department for low back pain and left leg radicular symptoms starting since Friday  Patient denies urinary symptoms  Patient admits to history of urinary tract infection and states she has none of those symptoms  Patient points to the posterior lumbar spine to the left SI joint  Patient states radiation to the left anterior thigh as well as to the left calf  This is very tender on exam   Negative straight leg test left  Patient is ambulatory without footdrop or any other concern on exam   No rashes noted  At this time will defer radiographic lumbar x-rays  Had a long and thoughtful discussion with patient and  in the room at this time will treat conservatively  Encourage patient to utilize Tylenol    Educated patient on any red flags such as weakness lower extremities bowel or bladder dysfunction fevers chills or worsening symptoms  Encourage close follow-up with her known OBGYN  Patient and male significant other mid understanding agreement patient was discharged in ambulatory stable condition  Disposition  Final diagnoses:   Pregnancy   Acute low back pain   Radiculopathy of leg     Time reflects when diagnosis was documented in both MDM as applicable and the Disposition within this note     Time User Action Codes Description Comment    11/27/2022  8:04 PM Jaspreet Smith [Q62 07] Pregnancy     11/27/2022  8:04 PM Ap Hernandez Add [M54 50] Acute low back pain     11/27/2022  8:04 PM Ap Hernandez Add [M54 10] Radiculopathy of leg       ED Disposition     ED Disposition   Discharge    Condition   Stable    Date/Time   Sun Nov 27, 2022  8:07 PM    Comment   Rakan Connolly discharge to home/self care  Follow-up Information     Follow up With Specialties Details Why Marcela Wilkerson MD Sports Medicine, Orthopedic Surgery   [address]            Discharge Medication List as of 11/27/2022  8:08 PM      START taking these medications    Details   acetaminophen (TYLENOL) 500 mg tablet Take 1 tablet (500 mg total) by mouth every 6 (six) hours as needed for mild pain or moderate pain, Starting Sun 11/27/2022, Print         CONTINUE these medications which have NOT CHANGED    Details   methocarbamol (ROBAXIN) 500 mg tablet Take 1 tablet (500 mg total) by mouth 2 (two) times a day for 5 days, Starting Thu 4/8/2021, Until Tue 4/13/2021, Normal             No discharge procedures on file      PDMP Review     None          ED Provider  Electronically Signed by           Rita Ugarte PA-C  11/28/22 7985

## 2022-11-28 NOTE — ED NOTES
AVS reviewed with pt by provider, pt verbalized understanding of d/c instructions  No questions or concerns at this time   Pt left ambulatory with steady gait, alert and oriented     Clarissa Wagoner RN  11/27/22 2047

## 2022-12-18 ENCOUNTER — HOSPITAL ENCOUNTER (OUTPATIENT)
Facility: HOSPITAL | Age: 37
Discharge: HOME | End: 2022-12-18
Attending: OBSTETRICS & GYNECOLOGY | Admitting: OBSTETRICS & GYNECOLOGY

## 2022-12-18 VITALS
RESPIRATION RATE: 16 BRPM | HEART RATE: 84 BPM | DIASTOLIC BLOOD PRESSURE: 67 MMHG | SYSTOLIC BLOOD PRESSURE: 122 MMHG | TEMPERATURE: 99.1 F | OXYGEN SATURATION: 98 %

## 2022-12-18 PROBLEM — O09.90 HRP (HIGH RISK PREGNANCY): Status: ACTIVE | Noted: 2022-10-14

## 2022-12-18 PROBLEM — O99.840 PREVIOUS BARIATRIC SURGERY COMPLICATING PREGNANCY: Status: ACTIVE | Noted: 2022-10-14

## 2022-12-18 PROBLEM — O09.529 AMA (ADVANCED MATERNAL AGE) MULTIGRAVIDA 35+: Status: ACTIVE | Noted: 2022-10-18

## 2022-12-18 PROBLEM — E55.9 VITAMIN D INSUFFICIENCY: Status: ACTIVE | Noted: 2022-10-13

## 2022-12-18 PROBLEM — Z28.21 INFLUENZA VACCINATION DECLINED: Status: ACTIVE | Noted: 2022-10-14

## 2022-12-18 PROBLEM — E66.813 CLASS 3 SEVERE OBESITY WITHOUT SERIOUS COMORBIDITY WITH BODY MASS INDEX (BMI) OF 45.0 TO 49.9 IN ADULT (HCC): Status: ACTIVE | Noted: 2021-12-07

## 2022-12-18 PROBLEM — E66.01 CLASS 3 SEVERE OBESITY WITHOUT SERIOUS COMORBIDITY WITH BODY MASS INDEX (BMI) OF 45.0 TO 49.9 IN ADULT (HCC): Status: ACTIVE | Noted: 2021-12-07

## 2022-12-18 PROBLEM — Z98.84 H/O BARIATRIC SURGERY: Status: ACTIVE | Noted: 2022-11-16

## 2022-12-18 PROBLEM — Z3A.18 18 WEEKS GESTATION OF PREGNANCY: Status: ACTIVE | Noted: 2022-12-18

## 2022-12-18 LAB
BACTERIA UR QL AUTO: ABNORMAL /HPF
BILIRUB UR QL STRIP: NEGATIVE
CLARITY UR: ABNORMAL
COLOR UR: ABNORMAL
GLUCOSE UR STRIP-MCNC: NEGATIVE MG/DL
HGB UR QL STRIP.AUTO: NEGATIVE
KETONES UR STRIP-MCNC: NEGATIVE MG/DL
LEUKOCYTE ESTERASE UR QL STRIP: NEGATIVE
NITRITE UR QL STRIP: NEGATIVE
NON-SQ EPI CELLS URNS QL MICRO: ABNORMAL /HPF
PH UR STRIP.AUTO: 6 [PH]
PROT UR STRIP-MCNC: NEGATIVE MG/DL
RBC #/AREA URNS AUTO: ABNORMAL /HPF
SP GR UR STRIP.AUTO: 1.02 (ref 1–1.03)
UROBILINOGEN UR QL STRIP.AUTO: 1 E.U./DL
WBC #/AREA URNS AUTO: ABNORMAL /HPF

## 2022-12-18 NOTE — PROGRESS NOTES
L&D Triage Note - OB/GYN  Marino Carrion 40 y o  female MRN: [id number]  Unit/Bed#: L&D 329-01 Encounter: [id number]        Patient is seen by [facility]    ASSESSMENT/PLAN  Marino Carrion is a 40 y o  Glena Cage at 18w4d here with lower abdominal pain, found to have a slightly shortened cervix but no evidence of  labor  1) r/o PTL  - cervix slightly short ranging from 2 4-2 5 cm, no ultrasound performed previously to compare, has Level 2 US scheduled in early January  - SVE /3  - no evidence of infection on microscopy  - no ctx on toco    2) Vulvar irritation  - reports some burning after sex as well as general itching  - no evidence for BV or yeast  - likely a contact dermatitis or dry skin  - recommended coconut oil or other non-scented emollient      3)  Discharge instructions  - Patient instructed to call if experiencing worsening contractions, vaginal bleeding, loss of fluid or decreased fetal movement  - Will follow up with OBGYN in office this Tuesday    D/w Dr Angela Carr  ______________    SUBJECTIVE    EMANUEL: Estimated Date of Delivery: None noted  HPI:  40 y o   at 18w4d presents with complaint of lower abdominal/pelvic pain  States she has a history of ovarian cysts but usually the pain comes and goes  This pain has been more constant since 5 AM and feels different  Last had intercourse 3 days ago  Also reports some vulvar itching and dryness  Reports burning after intercourse and when in contact with urine      Contractions: no, pain  Leakage of fluid: no  Vaginal Bleeding: no  Fetal movement: n/a    Her obstetrical history is significant for advanced maternal age, history of bariatric surgery, and BMI 36    ROS:  Constitutional: Negative  Respiratory: Negative  Cardiovascular: Negative    Gastrointestinal: Negative    Physical Exam  GEN: Well appearing, no apparent distress   ABD: Gravid, soft  SVE: Cervical Dilation: Closed  Cervical Effacement: 30  Cervical Consistency: Medium  Fetal Station: -3  Method: Manual  OB Examiner: Bisi   : Vulva externally erythematous, no lesions or other injury    OBJECTIVE:  /67   Pulse 84   Temp 99 1 °F (37 3 °C) (Oral)   Resp 16   LMP 08/10/2022   SpO2 98%   There is no height or weight on file to calculate BMI  Labs: No results found for this or any previous visit (from the past 24 hour(s))  - Speculum exam: Normal-appearing vaginal vault with minor physiologic discharge    [address]:     Infection:   - few clue cells    - no hyphae   - no trichomonads present    Membrane status   - no ferning   - neg nitrazine   - no pooling     SVE:  Cervical Dilation: Closed  Cervical Effacement: 30  Cervical Consistency: Medium  Fetal Station: -3  Method: Manual  OB Examiner: Bisi    FHT:   159bpm via TAUS    TOCO:   Contraction Frequency (minutes): 0  Contraction Duration (seconds): 0  Contraction Quality: Not applicable    IMAGING:       TVUS   Cervical length         - 2 40cm         - 2 46cm         - 2 52cm   Presentation: vertex        TAUS   LVP: 4 92    Verlene Schlatter, MD  OB/GYN PGY-2  12/18/2022  3:45 PM      Portions of the record may have been created with voice recognition software  Occasional wrong word or "sound a like" substitutions may have occurred due to the inherent limitations of voice recognition software    Read the chart carefully and recognize, using context, where substitutions have occurred

## 2022-12-18 NOTE — PROCEDURES
Valeriano, martha [place] at 18w4d with an EMANUEL of 5/17/2023, by Last Menstrual Period, was seen at [address] for the following procedure(s): $Procedure Type: US - Transvaginal, CHELY]         4 Quadrant CHELY  LVP (cm): 4 9 cm         Ultrasound Other  Fetal Presentation: Vertex  Cervical Length: 2 52  Funnel: No  Debris: Yes  Placenta Previa: No  Vasa Previa: No                     Ultrasound Probe Disinfection    A transvaginal ultrasound was performed     Prior to use, disinfection was performed with High Level Disinfection Process (Trophon)      Verlene Schlatter, MD  12/18/22  3:29 PM

## 2022-12-20 LAB — BACTERIA UR CULT: NORMAL

## 2023-03-11 ENCOUNTER — HOSPITAL ENCOUNTER (OUTPATIENT)
Facility: HOSPITAL | Age: 38
Discharge: HOME | End: 2023-03-11
Attending: OBSTETRICS & GYNECOLOGY | Admitting: OBSTETRICS & GYNECOLOGY

## 2023-03-11 VITALS
WEIGHT: 259.04 LBS | TEMPERATURE: 98.2 F | SYSTOLIC BLOOD PRESSURE: 124 MMHG | HEART RATE: 74 BPM | OXYGEN SATURATION: 100 % | BODY MASS INDEX: 39.39 KG/M2 | RESPIRATION RATE: 18 BRPM | DIASTOLIC BLOOD PRESSURE: 67 MMHG

## 2023-03-11 PROBLEM — Z3A.30 30 WEEKS GESTATION OF PREGNANCY: Status: ACTIVE | Noted: 2022-12-18

## 2023-03-11 RX ORDER — FERROUS SULFATE 325(65) MG
325 TABLET ORAL
COMMUNITY
Start: 2023-02-28 | End: 2024-02-28

## 2023-03-11 RX ORDER — ASCORBIC ACID 500 MG
500 TABLET ORAL DAILY
COMMUNITY
Start: 2023-02-28 | End: 2024-02-28

## 2023-03-12 NOTE — PROCEDURES
Valeriano, martha [place] at 30w3d with an EMANUEL of 5/17/2023, by Last Menstrual Period, was seen at [address] [facility] for the following procedure(s): $Procedure Type: NST, US - Transvaginal]    Nonstress Test  Reason for NST: Routine  Variability: Moderate  Decelerations: None  Accelerations: Yes  Acoustic Stimulator: No  Baseline: 145 BPM  Uterine Irritability: No  Contractions: Not present              Ultrasound Other  Fetal Presentation: Vertex  Cervical Length: 2 78  Funnel: No  Debris: No    Interpretation  Nonstress Test Interpretation: Reactive  Overall Impression: Reassuring    Ultrasound Probe Disinfection    A transvaginal ultrasound was performed     Prior to use, disinfection was performed with High Level Disinfection Process (Trophon)                Jaylyn Aguilera MD  03/11/23  9:36 PM

## 2023-03-12 NOTE — PROGRESS NOTES
L&D Triage Note - OB/GYN  Jonah Willis 40 y o  female MRN: [id number]  Unit/Bed#: L&D 329-02 Encounter: [id number]        Patient is seen by [facility]    ASSESSMENT/PLAN  Will Lazaro is a 40 y o   at 30w3d who presents for evaluation of back/abdominal pain  Low suspicion for  labor at this time  Back pain thought to be secondary to attic nerve compression  Discharged home with return precautions  1) r/o PTL  - No contractions appreciated on toco  - Cervical length 2 78 cm with no funneling  - Microscopy negative  - SVE 0/0/-4  - Pain reproducible with right straight leg raise  - Encouraged to use Tylenol heating pad and stretching for relief      KOH/Wet [place]:     Infection:   - no clue cells    - no hyphae   - no trichomonads present    Membrane status   - no ferning   - neg  nitrazene   - no pooling     SVE:  Cervical Dilation: Closed  Cervical Effacement: 0  Fetal Station: Ballotable    FHT:       TOCO:        IMAGING:       TVUS   Cervical length         - 2 90 cm         - 2 78 cm         - 3 29 cm   Presentation: Cephalic     2)  Discharge instructions  - Patient instructed to call if experiencing =contractions, vaginal bleeding, loss of fluid or decreased fetal movement   -Encouraged to follow up with [facility] OBGYN in office    D/w Dr Deirdre Dominguez   ______________    SUBJECTIVE    EMANUEL: Estimated Date of Delivery: 23    HPI:  40 y o  Landen Barrow presents with complaint ofabdominal pain  She reports that overnight she had some cramping and throughout the day today she has been experiencing pain radiating from her back around her flank and into her abdomen  She reports that the pain is more prominent on her right side  She denies any vaginal bleeding or leakage of fluid  She reports good fetal movement  She denies any recent intercourse or problems with the pregnancy       Contractions: Denies  Leakage of fluid: Denies  Vaginal Bleeding: Denies  Fetal movement: present    Her obstetrical history is significant for 1 prior SAB    Review of Systems   Musculoskeletal: Positive for back pain  Gastrointestinal: Positive for abdominal pain  All other systems reviewed and are negative  Physical Exam  Constitutional:       General: She is not in acute distress  Appearance: Normal appearance  HENT:      Head: Normocephalic and atraumatic  Cardiovascular:      Rate and Rhythm: Normal rate  Pulmonary:      Effort: Pulmonary effort is normal    Abdominal:      Palpations: Abdomen is soft  Tenderness: There is no abdominal tenderness  Comments: Gravid   Musculoskeletal:      Lumbar back: Positive right straight leg raise test  Negative left straight leg raise test    Neurological:      General: No focal deficit present  Mental Status: She is alert  Skin:     General: Skin is warm and dry  Psychiatric:         Mood and Affect: Mood normal           OBJECTIVE:  /67 (BP Location: Left arm)   Pulse 74   Temp 98 2 °F (36 8 °C) (Oral)   Resp 18   Wt 118 kg (259 lb 0 7 oz)   LMP 08/10/2022   SpO2 100%   BMI 39 39 kg/m²   Body mass index is 39 39 kg/m²  Labs: No results found for this or any previous visit (from the past 24 hour(s))      Anna Reilly MD  Obstetrics & Gynecology PGY-2  3/11/2023  9:50 PM

## 2023-04-23 ENCOUNTER — APPOINTMENT (EMERGENCY)
Dept: RADIOLOGY | Facility: HOSPITAL | Age: 38
End: 2023-04-23

## 2023-04-23 ENCOUNTER — HOSPITAL ENCOUNTER (EMERGENCY)
Facility: HOSPITAL | Age: 38
Discharge: HOME | End: 2023-04-23
Attending: EMERGENCY MEDICINE

## 2023-04-23 VITALS
OXYGEN SATURATION: 98 % | DIASTOLIC BLOOD PRESSURE: 69 MMHG | BODY MASS INDEX: 41.23 KG/M2 | WEIGHT: 271.17 LBS | RESPIRATION RATE: 18 BRPM | TEMPERATURE: 98.5 F | SYSTOLIC BLOOD PRESSURE: 123 MMHG | HEART RATE: 85 BPM

## 2023-04-23 DIAGNOSIS — J40 BRONCHITIS: Primary | ICD-10-CM

## 2023-04-23 DIAGNOSIS — J06.9 VIRAL URI WITH COUGH: ICD-10-CM

## 2023-04-23 DIAGNOSIS — Z3A.36 36 WEEKS GESTATION OF PREGNANCY: ICD-10-CM

## 2023-04-23 RX ORDER — ALBUTEROL SULFATE 2.5 MG/3ML
5 SOLUTION RESPIRATORY (INHALATION) ONCE
Status: COMPLETED | OUTPATIENT
Start: 2023-04-23 | End: 2023-04-23

## 2023-04-23 RX ORDER — ALBUTEROL SULFATE 90 UG/1
1-2 AEROSOL, METERED RESPIRATORY (INHALATION) EVERY 6 HOURS PRN
Qty: 6.7 G | Refills: 0 | Status: SHIPPED | OUTPATIENT
Start: 2023-04-23

## 2023-04-23 RX ORDER — ACETAMINOPHEN 325 MG/1
650 TABLET ORAL ONCE
Status: COMPLETED | OUTPATIENT
Start: 2023-04-23 | End: 2023-04-23

## 2023-04-23 RX ORDER — ALBUTEROL SULFATE 2.5 MG/3ML
2.5 SOLUTION RESPIRATORY (INHALATION) EVERY 6 HOURS PRN
Qty: 60 ML | Refills: 0 | Status: SHIPPED | OUTPATIENT
Start: 2023-04-23

## 2023-04-23 RX ORDER — METOCLOPRAMIDE 10 MG/1
10 TABLET ORAL ONCE
Status: COMPLETED | OUTPATIENT
Start: 2023-04-23 | End: 2023-04-23

## 2023-04-23 RX ORDER — PSEUDOEPHEDRINE HYDROCHLORIDE 60 MG/1
60 TABLET, FILM COATED ORAL EVERY 8 HOURS PRN
Qty: 15 TABLET | Refills: 0 | Status: SHIPPED | OUTPATIENT
Start: 2023-04-23

## 2023-04-23 RX ORDER — PSEUDOEPHEDRINE HYDROCHLORIDE 60 MG/1
60 TABLET, FILM COATED ORAL ONCE
Status: COMPLETED | OUTPATIENT
Start: 2023-04-23 | End: 2023-04-23

## 2023-04-23 RX ADMIN — ACETAMINOPHEN 325MG 650 MG: 325 TABLET ORAL at 16:20

## 2023-04-23 RX ADMIN — METOCLOPRAMIDE 10 MG: 10 TABLET ORAL at 16:20

## 2023-04-23 RX ADMIN — ALBUTEROL SULFATE 5 MG: 0.83 SOLUTION RESPIRATORY (INHALATION) at 15:57

## 2023-04-23 RX ADMIN — IPRATROPIUM BROMIDE 0.5 MG: 0.5 SOLUTION RESPIRATORY (INHALATION) at 15:57

## 2023-04-23 RX ADMIN — PSEUDOEPHEDRINE HYDROCHLORIDE 60 MG: 60 TABLET ORAL at 16:20

## 2023-04-23 NOTE — Clinical Note
Lyudmila Bryson was seen and treated in our emergency department on 4/23/2023  Diagnosis:     [facility]  may return to work on return date  She may return on this date: 04/27/2023         If you have any questions or concerns, please don't hesitate to call        Drew Donovan DO    ______________________________           _______________          _______________  Hospital Representative                              Date                                Time

## 2023-04-23 NOTE — Clinical Note
Doloresariane Hector was seen and treated in our emergency department on 4/23/2023  No restrictions            Diagnosis:     Didier Martin  may return to work on return date  She may return on this date: 04/24/2023         If you have any questions or concerns, please don't hesitate to call        Agatha Coker, DO    ______________________________           _______________          _______________  Hospital Representative                              Date                                Time

## 2023-04-23 NOTE — DISCHARGE INSTRUCTIONS
Albuterol as needed for cough/shortness of breath    Robitussin and/or Sudafed as needed for cough/congestion  Tylenol as needed for headache   Stay well hydrated    Return if worsening shortness of breath, chest pain, lightheadedness, passing out, etc

## 2023-04-23 NOTE — Clinical Note
Mara Welch was seen and treated in our emergency department on 4/23/2023  No restrictions            Diagnosis:     Marianela Childs  may return to work on return date  She may return on this date: 04/26/2023         If you have any questions or concerns, please don't hesitate to call        Lavern Nino DO    ______________________________           _______________          _______________  Hospital Representative                              Date                                Time

## 2023-04-23 NOTE — ED PROVIDER NOTES
History  Chief Complaint   Patient presents with   • Cough     Seen here for same recently, taking Robitussin, continues with cough  (+)pregnant - 36 weeks     HPI   40 yo F  36w4d pregnant, h/o asthma; presenting for evaluation of 1 week of cough/URI sx  C/o cough, occasionally productive, post-tussive emesis  Less nasal congestion  + HA  Taking Tylenol and Robitussin with minimal relief  She feels sx are worsening, mostly the cough and HA    Denies fevers, chills, CP, abdominal pain, loss of fluid, vaginal bleeding/discharge, urinary complaints  Normal fetal movements  H/o asthma, has neb machine at home, but no solution or albuterol pumps  No COVID/Flu vaccine  EMANUEL 23  H/o 1 spontaneous miscarriage @ about 4 weeks gestation      Per independent review of external records:  - ER  (5 days ago)-   COVID/Flu/RSV negative, strep negative  Tylenol, Robitussin, Saline nasal spray  NST normal    Prior to Admission Medications   Prescriptions Last Dose Informant Patient Reported? Taking?    Cholecalciferol 50 MCG (2000) CAPS   Yes No   Sig: Take 1 capsule by mouth daily   acetaminophen (TYLENOL) 500 mg tablet   No No   Sig: Take 1 tablet (500 mg total) by mouth every 6 (six) hours as needed for mild pain or moderate pain   ascorbic acid (VITAMIN C) 500 MG tablet   Yes No   Sig: Take 500 mg by mouth daily   ferrous sulfate 325 (65 Fe) mg tablet   Yes No   Sig: Take 325 mg by mouth   guaiFENesin (ROBITUSSIN) 100 MG/5ML oral liquid   No No   Sig: Take 5 mL (100 mg total) by mouth 4 (four) times a day as needed for cough for up to 10 doses   ondansetron (ZOFRAN-ODT) 4 mg disintegrating tablet   No No   Sig: Take 1 tablet (4 mg total) by mouth every 8 (eight) hours as needed for nausea for up to 10 doses      Facility-Administered Medications: None       Past Medical History:   Diagnosis Date   • Asthma    • Depression        Past Surgical History:   Procedure Laterality Date   • SLEEVE GASTROPLASTY Family History   Problem Relation Age of Onset   • Diabetes Mother    • Hypertension Mother    • Diabetes Father    • Hypertension Father    • Hyperlipidemia Father    • Cancer Maternal Aunt    • Cancer Maternal Uncle      I have reviewed and agree with the history as documented  E-Cigarette/Vaping   • E-Cigarette Use Never User      E-Cigarette/Vaping Substances   • Nicotine No    • THC No    • CBD No    • Flavoring No    • Other No    • Unknown No      Social History     Tobacco Use   • Smoking status: Never   • Smokeless tobacco: Never   Vaping Use   • Vaping Use: Never used   Substance Use Topics   • Alcohol use: Not Currently     Comment: social   • Drug use: Never       Review of Systems    Physical Exam  Physical Exam  Vitals and nursing note reviewed  Constitutional:       Appearance: She is well-developed  She is not diaphoretic  HENT:      Head: Normocephalic and atraumatic  Nose: Nose normal    Eyes:      Conjunctiva/sclera: Conjunctivae normal    Cardiovascular:      Rate and Rhythm: Normal rate and regular rhythm  Heart sounds: Normal heart sounds  Pulmonary:      Effort: Pulmonary effort is normal  No respiratory distress  Breath sounds: Normal breath sounds  No stridor  No wheezing or rales  Comments: Bronchospastic cough  Chest:      Chest wall: No tenderness  Abdominal:      General: There is no distension  Palpations: Abdomen is soft  Tenderness: There is no guarding or rebound  Comments: Gravid abdomen/nontender   Musculoskeletal:         General: No swelling or deformity  Cervical back: Normal range of motion and neck supple  Right lower leg: No edema  Left lower leg: No edema  Skin:     General: Skin is warm and dry  Findings: No rash  Neurological:      Mental Status: She is alert and oriented to person, place, and time  Motor: No abnormal muscle tone        Coordination: Coordination normal    Psychiatric: Thought Content: Thought content normal          Judgment: Judgment normal          Vital Signs  ED Triage Vitals [04/23/23 1529]   Temperature Pulse Respirations Blood Pressure SpO2   98 5 °F (36 9 °C) 85 18 142/81 98 %      Temp Source Heart Rate Source Patient Position - Orthostatic VS BP Location FiO2 (%)   Oral -- Sitting Right arm --      Pain Score       9           Vitals:    04/23/23 1529 04/23/23 1557   BP: 142/81 123/69   Pulse: 85    Patient Position - Orthostatic VS: Sitting Lying         Visual Acuity      ED Medications  Medications   acetaminophen (TYLENOL) tablet 650 mg (650 mg Oral Given 4/23/23 1620)   albuterol inhalation solution 5 mg (5 mg Nebulization Given 4/23/23 1557)   ipratropium (ATROVENT) 0 02 % inhalation solution 0 5 mg (0 5 mg Nebulization Given 4/23/23 1557)   metoclopramide (REGLAN) tablet 10 mg (10 mg Oral Given 4/23/23 1620)   pseudoephedrine (SUDAFED) tablet 60 mg (60 mg Oral Given 4/23/23 1620)       Diagnostic Studies  Results Reviewed     None                 No orders to display              Procedures  Procedures         ED Course  ED Course as of 04/23/23 1826   Sun Apr 23, 2023   1559 Blood Pressure: 123/69  Repeated by myself and normal   1606 Messaged OB/GYN, they will send nurse to do NST   1701  bpm  OB nurse at bedside   1715 Pt now refusing CXR   1732 NST normal  Pt feels better after albuterol/meds, less coughing                                             Medical Decision Making  40 yo F pregnant with cough/URI sx, clinically has bronchitis  History of asthma, no wheezing, but bronchospasm/cough that improved with albuterol  Discharged with albuterol, symptom management    NST normal    Return precautions reviewed    Bronchitis: acute illness or injury  Viral URI with cough: acute illness or injury  Risk  OTC drugs  Prescription drug management            Disposition  Final diagnoses:   Bronchitis   Viral URI with cough   36 weeks gestation of pregnancy Time reflects when diagnosis was documented in both MDM as applicable and the Disposition within this note     Time User Action Codes Description Comment    4/23/2023  5:32 PM Meriam Prost Add [J40] Bronchitis     4/23/2023  5:32 PM Lue Delude A Add [J06 9] Viral URI with cough     4/23/2023  5:33 PM Lue Judeude A Add [Z3A 36] 36 weeks gestation of pregnancy       ED Disposition     ED Disposition   Discharge    Condition   Stable    Date/Time   Sun Apr 23, 2023  5:32 PM    Comment   Merlin Beal discharge to home/self care                 Follow-up Information     Follow up With Specialties Details Why Contact Info        Follow up with your OB/GYN          Discharge Medication List as of 4/23/2023  5:38 PM      START taking these medications    Details   albuterol (2 5 mg/3 mL) 0 083 % nebulizer solution Take 3 mL (2 5 mg total) by nebulization every 6 (six) hours as needed for wheezing or shortness of breath for up to 20 doses, Starting Sun 4/23/2023, Print      albuterol (Ventolin HFA) 90 mcg/act inhaler Inhale 1-2 puffs every 6 (six) hours as needed for wheezing, Starting Sun 4/23/2023, Print      pseudoephedrine (SUDAFED) 60 mg tablet Take 1 tablet (60 mg total) by mouth every 8 (eight) hours as needed for congestion for up to 15 doses, Starting Sun 4/23/2023, Print         CONTINUE these medications which have NOT CHANGED    Details   acetaminophen (TYLENOL) 500 mg tablet Take 1 tablet (500 mg total) by mouth every 6 (six) hours as needed for mild pain or moderate pain, Starting Sun 11/27/2022, Print      ascorbic acid (VITAMIN C) 500 MG tablet Take 500 mg by mouth daily, Starting Tue 2/28/2023, Until Wed 2/28/2024, Historical Med      Cholecalciferol 50 MCG (2000 UT) CAPS Take 1 capsule by mouth daily, Starting Wed 11/16/2022, Until Thu 11/16/2023, Historical Med      ferrous sulfate 325 (65 Fe) mg tablet Take 325 mg by mouth, Starting Tue 2/28/2023, Until Wed 2/28/2024 at 2359, Historical Med guaiFENesin (ROBITUSSIN) 100 MG/5ML oral liquid Take 5 mL (100 mg total) by mouth 4 (four) times a day as needed for cough for up to 10 doses, Starting Tue 4/18/2023, Print      ondansetron (ZOFRAN-ODT) 4 mg disintegrating tablet Take 1 tablet (4 mg total) by mouth every 8 (eight) hours as needed for nausea for up to 10 doses, Starting Tue 4/18/2023, Print             No discharge procedures on file      PDMP Review     None          ED Provider  Electronically Signed by           Ирина Dejesus DO  04/23/23 7759

## 2023-04-23 NOTE — Clinical Note
Corina Garcia was seen and treated in our emergency department on 4/23/2023  No restrictions            Diagnosis:     Jolly Gibbons  may return to work on return date  She may return on this date: 04/26/2023         If you have any questions or concerns, please don't hesitate to call        Cornelio Mo DO    ______________________________           _______________          _______________  Hospital Representative                              Date                                Time

## 2023-04-23 NOTE — Clinical Note
Rashmi Forbes was seen and treated in our emergency department on 4/23/2023  No restrictions            Diagnosis:     Mat Stanley  may return to work on return date  She may return on this date: 04/28/2023         If you have any questions or concerns, please don't hesitate to call        Manuel Holley, DO    ______________________________           _______________          _______________  Hospital Representative                              Date                                Time

## 2023-04-23 NOTE — Clinical Note
Corina Garcia was seen and treated in our emergency department on 4/23/2023  No restrictions            Diagnosis:     Jolly Gibbons  may return to work on return date  She may return on this date: 04/28/2023         If you have any questions or concerns, please don't hesitate to call        Cornelio Mo DO    ______________________________           _______________          _______________  Hospital Representative                              Date                                Time

## 2023-04-23 NOTE — Clinical Note
Natan Rosas was seen and treated in our emergency department on 4/23/2023  Diagnosis:     Tessa Underwood  may return to work on return date  She may return on this date: 04/27/2023         If you have any questions or concerns, please don't hesitate to call        Melissa Staley, DO    ______________________________           _______________          _______________  Hospital Representative                              Date                                Time

## 2023-04-23 NOTE — Clinical Note
Merlin Beal was seen and treated in our emergency department on 4/23/2023  No restrictions            Diagnosis:     Son Vides  may return to work on return date  She may return on this date: 04/24/2023         If you have any questions or concerns, please don't hesitate to call        Ester Levine DO    ______________________________           _______________          _______________  Hospital Representative                              Date                                Time

## 2023-04-27 LAB
BILIRUB UR QL STRIP: NEGATIVE
CLARITY UR: CLEAR
COLOR UR: YELLOW
GLUCOSE UR STRIP-MCNC: NEGATIVE MG/DL
HGB UR QL STRIP.AUTO: NEGATIVE
KETONES UR STRIP-MCNC: NEGATIVE MG/DL
LEUKOCYTE ESTERASE UR QL STRIP: ABNORMAL
NITRITE UR QL STRIP: NEGATIVE
PH UR STRIP.AUTO: 7 [PH] (ref 4.5–8)
PROT UR STRIP-MCNC: ABNORMAL MG/DL
SP GR UR STRIP.AUTO: 1.02 (ref 1–1.03)
UROBILINOGEN UR QL STRIP.AUTO: 0.2 E.U./DL

## 2024-02-21 PROBLEM — Z01.419 ENCOUNTER FOR ANNUAL ROUTINE GYNECOLOGICAL EXAMINATION: Status: RESOLVED | Noted: 2019-09-24 | Resolved: 2024-02-21

## 2024-04-09 ENCOUNTER — APPOINTMENT (EMERGENCY)
Dept: ULTRASOUND IMAGING | Facility: HOSPITAL | Age: 39
End: 2024-04-09
Payer: COMMERCIAL

## 2024-04-09 ENCOUNTER — HOSPITAL ENCOUNTER (EMERGENCY)
Facility: HOSPITAL | Age: 39
Discharge: HOME | End: 2024-04-09
Attending: INTERNAL MEDICINE
Payer: COMMERCIAL

## 2024-04-09 VITALS
WEIGHT: 242.73 LBS | RESPIRATION RATE: 18 BRPM | OXYGEN SATURATION: 99 % | SYSTOLIC BLOOD PRESSURE: 125 MMHG | TEMPERATURE: 97 F | BODY MASS INDEX: 36.91 KG/M2 | DIASTOLIC BLOOD PRESSURE: 68 MMHG | HEART RATE: 67 BPM

## 2024-04-09 DIAGNOSIS — O23.40 UTI IN PREGNANCY: Primary | ICD-10-CM

## 2024-04-09 LAB
ALBUMIN SERPL BCP-MCNC: 4.1 G/DL (ref 3.5–5)
ALP SERPL-CCNC: 53 U/L (ref 34–104)
ALT SERPL W P-5'-P-CCNC: 11 U/L (ref 7–52)
ANION GAP SERPL CALCULATED.3IONS-SCNC: 5 MMOL/L (ref 4–13)
AST SERPL W P-5'-P-CCNC: 12 U/L (ref 13–39)
B-HCG SERPL-ACNC: ABNORMAL MIU/ML (ref 0–5)
BACTERIA UR QL AUTO: ABNORMAL /HPF
BASOPHILS # BLD AUTO: 0.03 THOUSANDS/ÂΜL (ref 0–0.1)
BASOPHILS NFR BLD AUTO: 1 % (ref 0–1)
BILIRUB SERPL-MCNC: 0.38 MG/DL (ref 0.2–1)
BILIRUB UR QL STRIP: NEGATIVE
BUN SERPL-MCNC: 9 MG/DL (ref 5–25)
CALCIUM SERPL-MCNC: 9.2 MG/DL (ref 8.4–10.2)
CHLORIDE SERPL-SCNC: 103 MMOL/L (ref 96–108)
CLARITY UR: CLEAR
CO2 SERPL-SCNC: 26 MMOL/L (ref 21–32)
COLOR UR: YELLOW
CREAT SERPL-MCNC: 0.79 MG/DL (ref 0.6–1.3)
EOSINOPHIL # BLD AUTO: 0.04 THOUSAND/ÂΜL (ref 0–0.61)
EOSINOPHIL NFR BLD AUTO: 1 % (ref 0–6)
ERYTHROCYTE [DISTWIDTH] IN BLOOD BY AUTOMATED COUNT: 13.9 % (ref 11.6–15.1)
EXT PREGNANCY TEST URINE: POSITIVE
EXT. CONTROL: ABNORMAL
GFR SERPL CREATININE-BSD FRML MDRD: 95 ML/MIN/1.73SQ M
GLUCOSE SERPL-MCNC: 83 MG/DL (ref 65–140)
GLUCOSE UR STRIP-MCNC: NEGATIVE MG/DL
HCT VFR BLD AUTO: 38 % (ref 34.8–46.1)
HGB BLD-MCNC: 12.3 G/DL (ref 11.5–15.4)
HGB UR QL STRIP.AUTO: NEGATIVE
IMM GRANULOCYTES # BLD AUTO: 0.01 THOUSAND/UL (ref 0–0.2)
IMM GRANULOCYTES NFR BLD AUTO: 0 % (ref 0–2)
KETONES UR STRIP-MCNC: NEGATIVE MG/DL
LEUKOCYTE ESTERASE UR QL STRIP: ABNORMAL
LIPASE SERPL-CCNC: 29 U/L (ref 11–82)
LYMPHOCYTES # BLD AUTO: 1.3 THOUSANDS/ÂΜL (ref 0.6–4.47)
LYMPHOCYTES NFR BLD AUTO: 24 % (ref 14–44)
MCH RBC QN AUTO: 26.5 PG (ref 26.8–34.3)
MCHC RBC AUTO-ENTMCNC: 32.4 G/DL (ref 31.4–37.4)
MCV RBC AUTO: 82 FL (ref 82–98)
MONOCYTES # BLD AUTO: 0.39 THOUSAND/ÂΜL (ref 0.17–1.22)
MONOCYTES NFR BLD AUTO: 7 % (ref 4–12)
MUCOUS THREADS UR QL AUTO: ABNORMAL
NEUTROPHILS # BLD AUTO: 3.67 THOUSANDS/ÂΜL (ref 1.85–7.62)
NEUTS SEG NFR BLD AUTO: 67 % (ref 43–75)
NITRITE UR QL STRIP: NEGATIVE
NON-SQ EPI CELLS URNS QL MICRO: ABNORMAL /HPF
NRBC BLD AUTO-RTO: 0 /100 WBCS
PH UR STRIP.AUTO: 6 [PH] (ref 4.5–8)
PLATELET # BLD AUTO: 226 THOUSANDS/UL (ref 149–390)
PMV BLD AUTO: 10.9 FL (ref 8.9–12.7)
POTASSIUM SERPL-SCNC: 3.6 MMOL/L (ref 3.5–5.3)
PROT SERPL-MCNC: 7.3 G/DL (ref 6.4–8.4)
PROT UR STRIP-MCNC: ABNORMAL MG/DL
RBC # BLD AUTO: 4.64 MILLION/UL (ref 3.81–5.12)
RBC #/AREA URNS AUTO: ABNORMAL /HPF
SODIUM SERPL-SCNC: 134 MMOL/L (ref 135–147)
SP GR UR STRIP.AUTO: 1.02 (ref 1–1.03)
UROBILINOGEN UR QL STRIP.AUTO: 0.2 E.U./DL
WBC # BLD AUTO: 5.44 THOUSAND/UL (ref 4.31–10.16)
WBC #/AREA URNS AUTO: ABNORMAL /HPF

## 2024-04-09 PROCEDURE — 80053 COMPREHEN METABOLIC PANEL: CPT | Performed by: INTERNAL MEDICINE

## 2024-04-09 PROCEDURE — 85025 COMPLETE CBC W/AUTO DIFF WBC: CPT | Performed by: INTERNAL MEDICINE

## 2024-04-09 PROCEDURE — 81001 URINALYSIS AUTO W/SCOPE: CPT

## 2024-04-09 PROCEDURE — 84702 CHORIONIC GONADOTROPIN TEST: CPT | Performed by: INTERNAL MEDICINE

## 2024-04-09 PROCEDURE — 76801 OB US < 14 WKS SINGLE FETUS: CPT

## 2024-04-09 PROCEDURE — 83690 ASSAY OF LIPASE: CPT | Performed by: INTERNAL MEDICINE

## 2024-04-09 PROCEDURE — 87086 URINE CULTURE/COLONY COUNT: CPT

## 2024-04-09 PROCEDURE — 36415 COLL VENOUS BLD VENIPUNCTURE: CPT | Performed by: INTERNAL MEDICINE

## 2024-04-09 PROCEDURE — 81025 URINE PREGNANCY TEST: CPT | Performed by: INTERNAL MEDICINE

## 2024-04-09 RX ORDER — SENNOSIDES 8.6 MG
650 CAPSULE ORAL EVERY 8 HOURS PRN
Qty: 30 TABLET | Refills: 0 | Status: SHIPPED | OUTPATIENT
Start: 2024-04-09

## 2024-04-09 RX ORDER — CEPHALEXIN 500 MG/1
500 CAPSULE ORAL EVERY 12 HOURS SCHEDULED
Qty: 14 CAPSULE | Refills: 0 | Status: SHIPPED | OUTPATIENT
Start: 2024-04-09 | End: 2024-04-16

## 2024-04-09 NOTE — ED PROVIDER NOTES
History  Chief Complaint   Patient presents with    Back Pain     Patient reports back pain over the past 2 weeks. Reports the pain has gotten worse and shoots down her left leg. Patient reports some worsening pain with urination. Reports is approximately 6w4d pregnant.      HPI  39-year-old woman  who is 6 weeks pregnant by last menstrual period presents to ED for evaluation of back pain.  Patient reports left-sided lower back pain. She states the pain also radiates down her left leg.  She denies any vaginal bleeding or discharge.  She reports pain worsens with urination.  Has not tried any medications or therapies.  Has not had any ultrasounds for this pregnancy yet.     Prior to Admission Medications   Prescriptions Last Dose Informant Patient Reported? Taking?   Cholecalciferol 50 MCG (2000) CAPS   Yes No   Sig: Take 1 capsule by mouth daily   acetaminophen (TYLENOL) 500 mg tablet   No No   Sig: Take 1 tablet (500 mg total) by mouth every 6 (six) hours as needed for mild pain or moderate pain   albuterol (2.5 mg/3 mL) 0.083 % nebulizer solution   No No   Sig: Take 3 mL (2.5 mg total) by nebulization every 6 (six) hours as needed for wheezing or shortness of breath for up to 20 doses   albuterol (Ventolin HFA) 90 mcg/act inhaler   No No   Sig: Inhale 1-2 puffs every 6 (six) hours as needed for wheezing   ascorbic acid (VITAMIN C) 500 MG tablet   Yes No   Sig: Take 500 mg by mouth daily   ferrous sulfate 325 (65 Fe) mg tablet   Yes No   Sig: Take 325 mg by mouth   guaiFENesin (ROBITUSSIN) 100 MG/5ML oral liquid   No No   Sig: Take 5 mL (100 mg total) by mouth 4 (four) times a day as needed for cough for up to 10 doses   ondansetron (ZOFRAN-ODT) 4 mg disintegrating tablet   No No   Sig: Take 1 tablet (4 mg total) by mouth every 8 (eight) hours as needed for nausea for up to 10 doses   pseudoephedrine (SUDAFED) 60 mg tablet   No No   Sig: Take 1 tablet (60 mg total) by mouth every 8 (eight) hours as needed  for congestion for up to 15 doses      Facility-Administered Medications: None       Past Medical History:   Diagnosis Date    Asthma     Depression        Past Surgical History:   Procedure Laterality Date    SLEEVE GASTROPLASTY         Family History   Problem Relation Age of Onset    Diabetes Mother     Hypertension Mother     Diabetes Father     Hypertension Father     Hyperlipidemia Father     Cancer Maternal Aunt     Cancer Maternal Uncle      I have reviewed and agree with the history as documented.    E-Cigarette/Vaping    E-Cigarette Use Never User      E-Cigarette/Vaping Substances    Nicotine No     THC No     CBD No     Flavoring No     Other No     Unknown No      Social History     Tobacco Use    Smoking status: Never    Smokeless tobacco: Never   Vaping Use    Vaping status: Never Used   Substance Use Topics    Alcohol use: Not Currently     Comment: social    Drug use: Never       Review of Systems   All other systems reviewed and are negative.      Physical Exam  Physical Exam  PHYSICAL EXAM    Constitutional:  Well developed, no acute distress  HEENT:  Conjunctiva normal. Oropharynx moist  Respiratory:  No respiratory distress  Cardiovascular:  Normal rate  GI:  Soft, nondistended, nontender  :  No costovertebral angle tenderness   Musculoskeletal:  No edema, no tenderness, no deformities  Integument:  Well hydrated, no rash   Lymphatic:  No lymphadenopathy noted   Neurologic:  Alert & oriented x 3, normal motor function, no focal deficits noted   Psychiatric:  Speech and behavior appropriate         Vital Signs  ED Triage Vitals [04/09/24 1133]   Temperature Pulse Respirations Blood Pressure SpO2   (!) 97 °F (36.1 °C) 82 16 138/67 98 %      Temp Source Heart Rate Source Patient Position - Orthostatic VS BP Location FiO2 (%)   Oral Monitor Sitting Right arm --      Pain Score       8           Vitals:    04/09/24 1133 04/09/24 1417 04/09/24 1543   BP: 138/67 104/55 125/68   Pulse: 82 73 67    Patient Position - Orthostatic VS: Sitting Sitting Lying         Visual Acuity      ED Medications  Medications - No data to display    Diagnostic Studies  Results Reviewed       Procedure Component Value Units Date/Time    Urine culture [[id number]] Collected: 04/09/24 1159    Lab Status: Preliminary result Specimen: Urine, Clean Catch Updated: 04/10/24 1000     Urine Culture Culture too young- will reincubate    Pregnancy, hCG, quantitative [[id number]]  (Abnormal) Collected: 04/09/24 1211    Lab Status: Final result Specimen: Blood from Arm, Left Updated: 04/09/24 1307     HCG, Quant 10,775 mIU/mL     Narrative:       Expected Ranges:    HCG results between 5 and 25 mIU/mL may be indicative of early pregnancy but should be interpreted in light of the total clinical presentation.    HCG can rise to detectable levels in gabrielle and post menopausal women (0-11.6 mIU/mL).     Approximate               Approximate HCG  Gestation age          Concentration ( mIU/mL)  _____________          ______________________   Weeks                      HCG values  0.2-1                       5-50  1-2                           2-3                         100-5000  3-4                         500-07171  4-5                         1000-18018  5-6                         [id number]  6-8                         [id number]  8-12                        [id number]      Comprehensive metabolic panel [[id number]]  (Abnormal) Collected: 04/09/24 1211    Lab Status: Final result Specimen: Blood from Arm, Left Updated: 04/09/24 1247     Sodium 134 mmol/L      Potassium 3.6 mmol/L      Chloride 103 mmol/L      CO2 26 mmol/L      ANION GAP 5 mmol/L      BUN 9 mg/dL      Creatinine 0.79 mg/dL      Glucose 83 mg/dL      Calcium 9.2 mg/dL      AST 12 U/L      ALT 11 U/L      Alkaline Phosphatase 53 U/L      Total Protein 7.3 g/dL      Albumin 4.1 g/dL      Total Bilirubin 0.38 mg/dL      eGFR 95 ml/min/1.73sq m     Narrative:       [employer] guidelines for Chronic Kidney Disease (CKD):     Stage 1 with normal or high GFR (GFR > 90 mL/min/1.73 square meters)    Stage 2 Mild CKD (GFR = 60-89 mL/min/1.73 square meters)    Stage 3A Moderate CKD (GFR = 45-59 mL/min/1.73 square meters)    Stage 3B Moderate CKD (GFR = 30-44 mL/min/1.73 square meters)    Stage 4 Severe CKD (GFR = 15-29 mL/min/1.73 square meters)    Stage 5 End Stage CKD (GFR <15 mL/min/1.73 square meters)  Note: GFR calculation is accurate only with a steady state creatinine    Lipase [[id number]]  (Normal) Collected: 04/09/24 1211    Lab Status: Final result Specimen: Blood from Arm, Left Updated: 04/09/24 1247     Lipase 29 u/L     Urine Microscopic [[id number]]  (Abnormal) Collected: 04/09/24 1159    Lab Status: Final result Specimen: Urine, Clean Catch Updated: 04/09/24 1219     RBC, UA 1-2 /hpf      WBC, UA 30-50 /hpf      Epithelial Cells Moderate /hpf      Bacteria, UA Occasional /hpf      MUCUS THREADS Innumerable    CBC and differential [[id number]]  (Abnormal) Collected: 04/09/24 1211    Lab Status: Final result Specimen: Blood from Arm, Left Updated: 04/09/24 1217     WBC 5.44 Thousand/uL      RBC 4.64 Million/uL      Hemoglobin 12.3 g/dL      Hematocrit 38.0 %      MCV 82 fL      MCH 26.5 pg      MCHC 32.4 g/dL      RDW 13.9 %      MPV 10.9 fL      Platelets 226 Thousands/uL      nRBC 0 /100 WBCs      Segmented % 67 %      Immature Grans % 0 %      Lymphocytes % 24 %      Monocytes % 7 %      Eosinophils Relative 1 %      Basophils Relative 1 %      Absolute Neutrophils 3.67 Thousands/µL      Absolute Immature Grans 0.01 Thousand/uL      Absolute Lymphocytes 1.30 Thousands/µL      Absolute Monocytes 0.39 Thousand/µL      Eosinophils Absolute 0.04 Thousand/µL      Basophils Absolute 0.03 Thousands/µL     POCT pregnancy, urine [[id number]]  (Abnormal) Resulted: 04/09/24 1201    Lab Status: Final result Updated: 04/09/24 1201     EXT Preg Test, Ur  Positive     Control Valid    Urine Macroscopic, POC [[id number]]  (Abnormal) Collected: 04/09/24 1159    Lab Status: Final result Specimen: Urine Updated: 04/09/24 1201     Color, UA Yellow     Clarity, UA Clear     pH, UA 6.0     Leukocytes, UA Moderate     Nitrite, UA Negative     Protein, UA Trace mg/dl      Glucose, UA Negative mg/dl      Ketones, UA Negative mg/dl      Urobilinogen, UA 0.2 E.U./dl      Bilirubin, UA Negative     Occult Blood, UA Negative     Specific Gravity, UA 1.025    Narrative:      CLINITEK RESULT                   US OB < 14 weeks with transvaginal   Final Result by Germán Adkins MD (04/09 1441)      Single live intrauterine gestation at 6 weeks 1 days (range +/- 4d).      EMANUEL of 12/02/2024.         Workstation performed: [id number]                    Procedures  Procedures         ED Course  ED Course as of 04/11/24 0652   Tue Apr 09, 2024   1207 PREGNANCY TEST URINE(!): Positive   1247 Leukocytes, UA(!): Moderate   1247 WBC, UA(!): 30-50   1532 Single live intrauterine gestation at 6 weeks 1 days (range +/- 4d).     EMANUEL of 12/02/2024.                                 SBIRT 22yo+      Flowsheet Row Most Recent Value   Initial Alcohol Screen: US AUDIT-C     1. How often do you have a drink containing alcohol? 0 Filed at: 04/09/2024 1303   2. How many drinks containing alcohol do you have on a typical day you are drinking?  0 Filed at: 04/09/2024 1303   3b. FEMALE Any Age, or MALE 65+: How often do you have 4 or more drinks on one occassion? 0 Filed at: 04/09/2024 1303   Audit-C Score 0 Filed at: 04/09/2024 1303   ALLEN: How many times in the past year have you...    Used an illegal drug or used a prescription medication for non-medical reasons? Never Filed at: 04/09/2024 1303                      Medical Decision Making  Differential diagnose includes ectopic pregnancy, intrauterine pregnancy, threatened miscarriage, urinary tract infection, ovarian cyst, metabolic disturbance, liver  dysfunction, kidney dysfunction.    Amount and/or Complexity of Data Reviewed  Labs: ordered. Decision-making details documented in ED Course.  Radiology: ordered.    Risk  OTC drugs.  Prescription drug management.             Disposition  Final diagnoses:   UTI in pregnancy     Time reflects when diagnosis was documented in both MDM as applicable and the Disposition within this note       Time User Action Codes Description Comment    4/9/2024 12:47 PM Leslye Swanson [O23.40] UTI in pregnancy           ED Disposition       ED Disposition   Discharge    Condition   Stable    Date/Time   Tue Apr 9, 2024 1532    Comment   Serina Merrill discharge to home/self care.                   Follow-up Information    None         Discharge Medication List as of 4/9/2024  3:34 PM        START taking these medications    Details   acetaminophen (TYLENOL) 650 mg CR tablet Take 1 tablet (650 mg total) by mouth every 8 (eight) hours as needed for mild pain, Starting Tue 4/9/2024, Normal      cephalexin (KEFLEX) 500 mg capsule Take 1 capsule (500 mg total) by mouth every 12 (twelve) hours for 7 days, Starting Tue 4/9/2024, Until Tue 4/16/2024, Normal           CONTINUE these medications which have NOT CHANGED    Details   acetaminophen (TYLENOL) 500 mg tablet Take 1 tablet (500 mg total) by mouth every 6 (six) hours as needed for mild pain or moderate pain, Starting Sun 11/27/2022, Print      albuterol (2.5 mg/3 mL) 0.083 % nebulizer solution Take 3 mL (2.5 mg total) by nebulization every 6 (six) hours as needed for wheezing or shortness of breath for up to 20 doses, Starting Sun 4/23/2023, Print      albuterol (Ventolin HFA) 90 mcg/act inhaler Inhale 1-2 puffs every 6 (six) hours as needed for wheezing, Starting Sun 4/23/2023, Print      ascorbic acid (VITAMIN C) 500 MG tablet Take 500 mg by mouth daily, Starting Tue 2/28/2023, Until Wed 2/28/2024, Historical Med      Cholecalciferol 50 MCG (2000 UT) CAPS Take 1 capsule by mouth  daily, Starting Wed 11/16/2022, Until Thu 11/16/2023, Historical Med      ferrous sulfate 325 (65 Fe) mg tablet Take 325 mg by mouth, Starting Tue 2/28/2023, Until Wed 2/28/2024 at 2359, Historical Med      guaiFENesin (ROBITUSSIN) 100 MG/5ML oral liquid Take 5 mL (100 mg total) by mouth 4 (four) times a day as needed for cough for up to 10 doses, Starting Tue 4/18/2023, Print      ondansetron (ZOFRAN-ODT) 4 mg disintegrating tablet Take 1 tablet (4 mg total) by mouth every 8 (eight) hours as needed for nausea for up to 10 doses, Starting Tue 4/18/2023, Print      pseudoephedrine (SUDAFED) 60 mg tablet Take 1 tablet (60 mg total) by mouth every 8 (eight) hours as needed for congestion for up to 15 doses, Starting Sun 4/23/2023, Print             No discharge procedures on file.    PDMP Review       None            ED Provider  Electronically Signed by             Leslye Swanson MD  04/11/24 0652

## 2024-04-09 NOTE — DISCHARGE INSTRUCTIONS
US OB < 14 weeks with transvaginal    Result Date: 4/9/2024  Impression: Single live intrauterine gestation at 6 weeks 1 days (range +/- 4d). EMANUEL of 12/02/2024. Workstation performed: [id number]

## 2024-04-09 NOTE — Clinical Note
Serina Merrill was seen and treated in our emergency department on 4/9/2024.                Diagnosis:     Serina  .    She may return on this date:          If you have any questions or concerns, please don't hesitate to call.      Leslye Swanson MD    ______________________________           _______________          _______________  Hospital Representative                              Date                                Time

## 2024-04-11 LAB
BACTERIA UR CULT: ABNORMAL
BACTERIA UR CULT: ABNORMAL

## 2024-04-14 ENCOUNTER — APPOINTMENT (EMERGENCY)
Dept: ULTRASOUND IMAGING | Facility: HOSPITAL | Age: 39
End: 2024-04-14
Payer: COMMERCIAL

## 2024-04-14 ENCOUNTER — HOSPITAL ENCOUNTER (EMERGENCY)
Facility: HOSPITAL | Age: 39
Discharge: HOME | End: 2024-04-14
Attending: EMERGENCY MEDICINE
Payer: COMMERCIAL

## 2024-04-14 VITALS
OXYGEN SATURATION: 100 % | DIASTOLIC BLOOD PRESSURE: 55 MMHG | RESPIRATION RATE: 16 BRPM | HEART RATE: 61 BPM | BODY MASS INDEX: 36.71 KG/M2 | TEMPERATURE: 98.2 F | WEIGHT: 241.4 LBS | SYSTOLIC BLOOD PRESSURE: 99 MMHG

## 2024-04-14 DIAGNOSIS — K80.20 CHOLELITHIASIS: ICD-10-CM

## 2024-04-14 DIAGNOSIS — O21.9 NAUSEA AND VOMITING IN PREGNANCY PRIOR TO 22 WEEKS GESTATION: Primary | ICD-10-CM

## 2024-04-14 LAB
ALBUMIN SERPL BCP-MCNC: 4 G/DL (ref 3.5–5)
ALP SERPL-CCNC: 98 U/L (ref 34–104)
ALT SERPL W P-5'-P-CCNC: 178 U/L (ref 7–52)
ANION GAP SERPL CALCULATED.3IONS-SCNC: 5 MMOL/L (ref 4–13)
AST SERPL W P-5'-P-CCNC: 171 U/L (ref 13–39)
BACTERIA UR QL AUTO: ABNORMAL /HPF
BASOPHILS # BLD AUTO: 0.03 THOUSANDS/ÂΜL (ref 0–0.1)
BASOPHILS NFR BLD AUTO: 0 % (ref 0–1)
BILIRUB SERPL-MCNC: 0.38 MG/DL (ref 0.2–1)
BILIRUB UR QL STRIP: NEGATIVE
BUN SERPL-MCNC: 9 MG/DL (ref 5–25)
CALCIUM SERPL-MCNC: 9.7 MG/DL (ref 8.4–10.2)
CHLORIDE SERPL-SCNC: 104 MMOL/L (ref 96–108)
CLARITY UR: CLEAR
CO2 SERPL-SCNC: 26 MMOL/L (ref 21–32)
COLOR UR: YELLOW
CREAT SERPL-MCNC: 0.73 MG/DL (ref 0.6–1.3)
EOSINOPHIL # BLD AUTO: 0.02 THOUSAND/ÂΜL (ref 0–0.61)
EOSINOPHIL NFR BLD AUTO: 0 % (ref 0–6)
ERYTHROCYTE [DISTWIDTH] IN BLOOD BY AUTOMATED COUNT: 14.3 % (ref 11.6–15.1)
GFR SERPL CREATININE-BSD FRML MDRD: 104 ML/MIN/1.73SQ M
GLUCOSE SERPL-MCNC: 87 MG/DL (ref 65–140)
GLUCOSE UR STRIP-MCNC: NEGATIVE MG/DL
HCT VFR BLD AUTO: 36.1 % (ref 34.8–46.1)
HGB BLD-MCNC: 11.8 G/DL (ref 11.5–15.4)
HGB UR QL STRIP.AUTO: NEGATIVE
IMM GRANULOCYTES # BLD AUTO: 0.03 THOUSAND/UL (ref 0–0.2)
IMM GRANULOCYTES NFR BLD AUTO: 0 % (ref 0–2)
KETONES UR STRIP-MCNC: NEGATIVE MG/DL
LEUKOCYTE ESTERASE UR QL STRIP: ABNORMAL
LIPASE SERPL-CCNC: 28 U/L (ref 11–82)
LYMPHOCYTES # BLD AUTO: 1.19 THOUSANDS/ÂΜL (ref 0.6–4.47)
LYMPHOCYTES NFR BLD AUTO: 16 % (ref 14–44)
MCH RBC QN AUTO: 26.9 PG (ref 26.8–34.3)
MCHC RBC AUTO-ENTMCNC: 32.7 G/DL (ref 31.4–37.4)
MCV RBC AUTO: 82 FL (ref 82–98)
MONOCYTES # BLD AUTO: 0.48 THOUSAND/ÂΜL (ref 0.17–1.22)
MONOCYTES NFR BLD AUTO: 7 % (ref 4–12)
MUCOUS THREADS UR QL AUTO: ABNORMAL
NEUTROPHILS # BLD AUTO: 5.56 THOUSANDS/ÂΜL (ref 1.85–7.62)
NEUTS SEG NFR BLD AUTO: 77 % (ref 43–75)
NITRITE UR QL STRIP: NEGATIVE
NON-SQ EPI CELLS URNS QL MICRO: ABNORMAL /HPF
NRBC BLD AUTO-RTO: 0 /100 WBCS
PH UR STRIP.AUTO: 7 [PH] (ref 4.5–8)
PLATELET # BLD AUTO: 224 THOUSANDS/UL (ref 149–390)
PMV BLD AUTO: 11 FL (ref 8.9–12.7)
POTASSIUM SERPL-SCNC: 4.2 MMOL/L (ref 3.5–5.3)
PROT SERPL-MCNC: 6.8 G/DL (ref 6.4–8.4)
PROT UR STRIP-MCNC: NEGATIVE MG/DL
RBC # BLD AUTO: 4.38 MILLION/UL (ref 3.81–5.12)
RBC #/AREA URNS AUTO: ABNORMAL /HPF
SODIUM SERPL-SCNC: 135 MMOL/L (ref 135–147)
SP GR UR STRIP.AUTO: 1.02 (ref 1–1.03)
UROBILINOGEN UR QL STRIP.AUTO: 1 E.U./DL
WBC # BLD AUTO: 7.31 THOUSAND/UL (ref 4.31–10.16)
WBC #/AREA URNS AUTO: ABNORMAL /HPF

## 2024-04-14 PROCEDURE — 96375 TX/PRO/DX INJ NEW DRUG ADDON: CPT

## 2024-04-14 PROCEDURE — 83690 ASSAY OF LIPASE: CPT | Performed by: EMERGENCY MEDICINE

## 2024-04-14 PROCEDURE — 80053 COMPREHEN METABOLIC PANEL: CPT | Performed by: EMERGENCY MEDICINE

## 2024-04-14 PROCEDURE — 99284 EMERGENCY DEPT VISIT MOD MDM: CPT

## 2024-04-14 PROCEDURE — 81001 URINALYSIS AUTO W/SCOPE: CPT

## 2024-04-14 PROCEDURE — 96365 THER/PROPH/DIAG IV INF INIT: CPT

## 2024-04-14 PROCEDURE — 99284 EMERGENCY DEPT VISIT MOD MDM: CPT | Performed by: EMERGENCY MEDICINE

## 2024-04-14 PROCEDURE — 36415 COLL VENOUS BLD VENIPUNCTURE: CPT | Performed by: EMERGENCY MEDICINE

## 2024-04-14 PROCEDURE — 85025 COMPLETE CBC W/AUTO DIFF WBC: CPT | Performed by: EMERGENCY MEDICINE

## 2024-04-14 PROCEDURE — 76705 ECHO EXAM OF ABDOMEN: CPT

## 2024-04-14 PROCEDURE — 87086 URINE CULTURE/COLONY COUNT: CPT

## 2024-04-14 RX ORDER — METOCLOPRAMIDE 10 MG/1
10 TABLET ORAL 3 TIMES DAILY PRN
Qty: 12 TABLET | Refills: 0 | Status: SHIPPED | OUTPATIENT
Start: 2024-04-14

## 2024-04-14 RX ORDER — FAMOTIDINE 20 MG/1
20 TABLET, FILM COATED ORAL 2 TIMES DAILY
Qty: 28 TABLET | Refills: 0 | Status: SHIPPED | OUTPATIENT
Start: 2024-04-14

## 2024-04-14 RX ORDER — METOCLOPRAMIDE HYDROCHLORIDE 5 MG/ML
10 INJECTION INTRAMUSCULAR; INTRAVENOUS ONCE
Status: COMPLETED | OUTPATIENT
Start: 2024-04-14 | End: 2024-04-14

## 2024-04-14 RX ORDER — ONDANSETRON 4 MG/1
4 TABLET, ORALLY DISINTEGRATING ORAL EVERY 6 HOURS PRN
Qty: 8 TABLET | Refills: 0 | Status: SHIPPED | OUTPATIENT
Start: 2024-04-14

## 2024-04-14 RX ORDER — METHOCARBAMOL 500 MG/1
500 TABLET, FILM COATED ORAL ONCE
Status: DISCONTINUED | OUTPATIENT
Start: 2024-04-14 | End: 2024-04-14

## 2024-04-14 RX ORDER — FAMOTIDINE 10 MG/ML
20 INJECTION, SOLUTION INTRAVENOUS ONCE
Status: COMPLETED | OUTPATIENT
Start: 2024-04-14 | End: 2024-04-14

## 2024-04-14 RX ADMIN — DEXTROSE, SODIUM CHLORIDE, SODIUM LACTATE, POTASSIUM CHLORIDE, AND CALCIUM CHLORIDE 1000 ML: 5; .6; .31; .03; .02 INJECTION, SOLUTION INTRAVENOUS at 17:35

## 2024-04-14 RX ADMIN — METOCLOPRAMIDE 10 MG: 5 INJECTION, SOLUTION INTRAMUSCULAR; INTRAVENOUS at 17:33

## 2024-04-14 RX ADMIN — FAMOTIDINE 20 MG: 10 INJECTION INTRAVENOUS at 17:31

## 2024-04-14 NOTE — ED PROVIDER NOTES
History  Chief Complaint   Patient presents with    Abdominal Pain     Pt reports mid abdominal pain that started yesterday and describes it as a burning/twisting sensation. Pt states she has been unable to keep down solids/water for the most of the day due to vomiting. Pt is 6 weeks pregnant and was dx with a UTI on Monday and was prescribed antibiotics which she has been unable to tolerate today/      40 yo F 7w0d c/o onset of abdominal pain she localizes to epigastric, since 4/8/24, exacerbated by eating and drinking, and onset of N/V since then as well, but has become significantly worse for 2 days.  She was evaluated in the ED 4/8 for back and flank pain, diagnosed with UTI, and she had ultrasound with IUP confirmed.  She started feeling sick after being seeing in the ED, and it has been escalating since then.  She has been taking the abx, although today she basically didn't tolerate it.        History provided by:  Patient      Prior to Admission Medications   Prescriptions Last Dose Informant Patient Reported? Taking?   Cholecalciferol 50 MCG (2000 UT) CAPS   Yes No   Sig: Take 1 capsule by mouth daily   acetaminophen (TYLENOL) 500 mg tablet   No No   Sig: Take 1 tablet (500 mg total) by mouth every 6 (six) hours as needed for mild pain or moderate pain   acetaminophen (TYLENOL) 650 mg CR tablet   No No   Sig: Take 1 tablet (650 mg total) by mouth every 8 (eight) hours as needed for mild pain   albuterol (2.5 mg/3 mL) 0.083 % nebulizer solution   No No   Sig: Take 3 mL (2.5 mg total) by nebulization every 6 (six) hours as needed for wheezing or shortness of breath for up to 20 doses   albuterol (Ventolin HFA) 90 mcg/act inhaler   No No   Sig: Inhale 1-2 puffs every 6 (six) hours as needed for wheezing   ascorbic acid (VITAMIN C) 500 MG tablet   Yes No   Sig: Take 500 mg by mouth daily   cephalexin (KEFLEX) 500 mg capsule   No No   Sig: Take 1 capsule (500 mg total) by mouth every 12 (twelve) hours for 7 days    ferrous sulfate 325 (65 Fe) mg tablet   Yes No   Sig: Take 325 mg by mouth   guaiFENesin (ROBITUSSIN) 100 MG/5ML oral liquid   No No   Sig: Take 5 mL (100 mg total) by mouth 4 (four) times a day as needed for cough for up to 10 doses   ondansetron (ZOFRAN-ODT) 4 mg disintegrating tablet   No No   Sig: Take 1 tablet (4 mg total) by mouth every 8 (eight) hours as needed for nausea for up to 10 doses   pseudoephedrine (SUDAFED) 60 mg tablet   No No   Sig: Take 1 tablet (60 mg total) by mouth every 8 (eight) hours as needed for congestion for up to 15 doses      Facility-Administered Medications: None       Past Medical History:   Diagnosis Date    Asthma     Depression        Past Surgical History:   Procedure Laterality Date    SLEEVE GASTROPLASTY         Family History   Problem Relation Age of Onset    Diabetes Mother     Hypertension Mother     Diabetes Father     Hypertension Father     Hyperlipidemia Father     Cancer Maternal Aunt     Cancer Maternal Uncle      I have reviewed and agree with the history as documented.    E-Cigarette/Vaping    E-Cigarette Use Never User      E-Cigarette/Vaping Substances    Nicotine No     THC No     CBD No     Flavoring No     Other No     Unknown No      Social History     Tobacco Use    Smoking status: Never    Smokeless tobacco: Never   Vaping Use    Vaping status: Never Used   Substance Use Topics    Alcohol use: Not Currently     Comment: social    Drug use: Never       Review of Systems    Physical Exam  Physical Exam  Vitals and nursing note reviewed.   Constitutional:       General: She is not in acute distress.     Appearance: She is well-developed. She is not diaphoretic.   HENT:      Head: Normocephalic and atraumatic.      Right Ear: External ear normal.      Left Ear: External ear normal.      Nose: Nose normal.      Mouth/Throat:      Mouth: Mucous membranes are moist.   Eyes:      Conjunctiva/sclera: Conjunctivae normal.      Pupils: Pupils are equal, round, and  reactive to light.   Cardiovascular:      Rate and Rhythm: Normal rate and regular rhythm.   Pulmonary:      Effort: Pulmonary effort is normal.   Abdominal:      Palpations: Abdomen is soft.      Tenderness: There is abdominal tenderness in the epigastric area.   Musculoskeletal:         General: Normal range of motion.      Cervical back: Normal range of motion and neck supple.   Skin:     General: Skin is warm and dry.      Capillary Refill: Capillary refill takes less than 2 seconds.      Findings: No rash.   Neurological:      Mental Status: She is alert and oriented to person, place, and time.      Gait: Gait normal.   Psychiatric:         Behavior: Behavior normal.         Thought Content: Thought content normal.         Judgment: Judgment normal.         Vital Signs  ED Triage Vitals [04/14/24 1657]   Temperature Pulse Respirations Blood Pressure SpO2   98.2 °F (36.8 °C) 74 16 130/66 98 %      Temp Source Heart Rate Source Patient Position - Orthostatic VS BP Location FiO2 (%)   Oral Monitor Sitting Right arm --      Pain Score       --           Vitals:    04/14/24 1657 04/14/24 1905   BP: 130/66 99/55   Pulse: 74 61   Patient Position - Orthostatic VS: Sitting Lying         Visual Acuity      ED Medications  Medications   dextrose 5% lactated Ringer's bolus 1,000 mL (0 mL Intravenous Stopped 4/14/24 1859)   Famotidine (PF) (PEPCID) injection 20 mg (20 mg Intravenous Given 4/14/24 1731)   metoclopramide (REGLAN) injection 10 mg (10 mg Intravenous Given 4/14/24 1733)       Diagnostic Studies  Results Reviewed       Procedure Component Value Units Date/Time    Comprehensive metabolic panel [[id number]]  (Abnormal) Collected: 04/14/24 1735    Lab Status: Final result Specimen: Blood from Arm, Right Updated: 04/14/24 1800     Sodium 135 mmol/L      Potassium 4.2 mmol/L      Chloride 104 mmol/L      CO2 26 mmol/L      ANION GAP 5 mmol/L      BUN 9 mg/dL      Creatinine 0.73 mg/dL      Glucose 87 mg/dL       Calcium 9.7 mg/dL       U/L       U/L      Alkaline Phosphatase 98 U/L      Total Protein 6.8 g/dL      Albumin 4.0 g/dL      Total Bilirubin 0.38 mg/dL      eGFR 104 ml/min/1.73sq m     Narrative:      [employer] guidelines for Chronic Kidney Disease (CKD):     Stage 1 with normal or high GFR (GFR > 90 mL/min/1.73 square meters)    Stage 2 Mild CKD (GFR = 60-89 mL/min/1.73 square meters)    Stage 3A Moderate CKD (GFR = 45-59 mL/min/1.73 square meters)    Stage 3B Moderate CKD (GFR = 30-44 mL/min/1.73 square meters)    Stage 4 Severe CKD (GFR = 15-29 mL/min/1.73 square meters)    Stage 5 End Stage CKD (GFR <15 mL/min/1.73 square meters)  Note: GFR calculation is accurate only with a steady state creatinine    Lipase [[id number]]  (Normal) Collected: 04/14/24 1735    Lab Status: Final result Specimen: Blood from Arm, Right Updated: 04/14/24 1800     Lipase 28 u/L     Urine Microscopic [[id number]]  (Abnormal) Collected: 04/14/24 1742    Lab Status: Final result Specimen: Urine, Clean Catch Updated: 04/14/24 1758     RBC, UA 1-2 /hpf      WBC, UA 4-10 /hpf      Epithelial Cells Moderate /hpf      Bacteria, UA Occasional /hpf      MUCUS THREADS Occasional    Urine culture [[id number]] Collected: 04/14/24 1742    Lab Status: In process Specimen: Urine, Clean Catch Updated: 04/14/24 1747    CBC and differential [[id number]]  (Abnormal) Collected: 04/14/24 1735    Lab Status: Final result Specimen: Blood from Arm, Right Updated: 04/14/24 1744     WBC 7.31 Thousand/uL      RBC 4.38 Million/uL      Hemoglobin 11.8 g/dL      Hematocrit 36.1 %      MCV 82 fL      MCH 26.9 pg      MCHC 32.7 g/dL      RDW 14.3 %      MPV 11.0 fL      Platelets 224 Thousands/uL      nRBC 0 /100 WBCs      Segmented % 77 %      Immature Grans % 0 %      Lymphocytes % 16 %      Monocytes % 7 %      Eosinophils Relative 0 %      Basophils Relative 0 %      Absolute Neutrophils 5.56 Thousands/µL      Absolute  "Immature Grans 0.03 Thousand/uL      Absolute Lymphocytes 1.19 Thousands/µL      Absolute Monocytes 0.48 Thousand/µL      Eosinophils Absolute 0.02 Thousand/µL      Basophils Absolute 0.03 Thousands/µL     Urine Macroscopic, POC [[id number]]  (Abnormal) Collected: 04/14/24 1742    Lab Status: Final result Specimen: Urine Updated: 04/14/24 1743     Color, UA Yellow     Clarity, UA Clear     pH, UA 7.0     Leukocytes, UA Small     Nitrite, UA Negative     Protein, UA Negative mg/dl      Glucose, UA Negative mg/dl      Ketones, UA Negative mg/dl      Urobilinogen, UA 1.0 E.U./dl      Bilirubin, UA Negative     Occult Blood, UA Negative     Specific Gravity, UA 1.020    Narrative:      CLINITEK RESULT                   US right upper quadrant   Final Result by Zach Askew MD (04/14 1936)      Cholelithiasis without acute cholecystitis.      Workstation performed: [id number]                    Procedures  Procedures         ED Course  ED Course as of 04/14/24 2146   Sun Apr 14, 2024 1817 Ketones, UA: Negative  Reassuring, she is not experiencing severe volume depletion   1819 Mild elevation of LFTs, alk phos and bilirubin is normal   1829 She has improvement in baseline discomfort, with the mild transaminitis, I am inclined to confirm no cholecystitis on exam   2014 US right upper quadrant  Imaging reviewed and interpreted myself and concur with radiologist:   \"Cholelithiasis without acute cholecystitis\"                               SBIRT 22yo+      Flowsheet Row Most Recent Value   Initial Alcohol Screen: US AUDIT-C     1. How often do you have a drink containing alcohol? 0 Filed at: 04/14/2024 1737   2. How many drinks containing alcohol do you have on a typical day you are drinking?  0 Filed at: 04/14/2024 1737   3b. FEMALE Any Age, or MALE 65+: How often do you have 4 or more drinks on one occassion? 0 Filed at: 04/14/2024 1737   Audit-C Score 0 Filed at: 04/14/2024 1737   ALLEN: How many times in the past year " have you...    Used an illegal drug or used a prescription medication for non-medical reasons? Never Filed at: 04/14/2024 9563                      Medical Decision Making  Amount and/or Complexity of Data Reviewed  Labs: ordered. Decision-making details documented in ED Course.  Radiology: ordered. Decision-making details documented in ED Course.    Risk  Prescription drug management.             Disposition  Final diagnoses:   Nausea and vomiting in pregnancy prior to 22 weeks gestation   Cholelithiasis     Time reflects when diagnosis was documented in both MDM as applicable and the Disposition within this note       Time User Action Codes Description Comment    4/14/2024  8:33 PM Jasmeet Dumont Add [O21.9] Nausea and vomiting in pregnancy prior to 22 weeks gestation     4/14/2024  8:33 PM Jasmeet Dumont Add [K80.20] Cholelithiasis           ED Disposition       ED Disposition   Discharge    Condition   Good    Date/Time   Sun Apr 14, 2024 2033    Comment   Serina Merrill discharge to home/self care.                   Follow-up Information       Follow up With Specialties Details Why Contact Info    [facility] - [address] [place] [address]   [phone]            Discharge Medication List as of 4/14/2024  8:34 PM        START taking these medications    Details   famotidine (PEPCID) 20 mg tablet Take 1 tablet (20 mg total) by mouth 2 (two) times a day, Starting Sun 4/14/2024, Normal      metoclopramide (REGLAN) 10 mg tablet Take 1 tablet (10 mg total) by mouth 3 (three) times a day as needed (nausea), Starting Sun 4/14/2024, Normal      !! ondansetron (ZOFRAN-ODT) 4 mg disintegrating tablet Take 1 tablet (4 mg total) by mouth every 6 (six) hours as needed for nausea or vomiting, Starting Sun 4/14/2024, Normal       !! - Potential duplicate medications found. Please discuss with provider.        CONTINUE these medications which have NOT CHANGED     Details   acetaminophen (TYLENOL) 500 mg tablet Take 1 tablet (500 mg total) by mouth every 6 (six) hours as needed for mild pain or moderate pain, Starting Sun 11/27/2022, Print      acetaminophen (TYLENOL) 650 mg CR tablet Take 1 tablet (650 mg total) by mouth every 8 (eight) hours as needed for mild pain, Starting Tue 4/9/2024, Normal      albuterol (2.5 mg/3 mL) 0.083 % nebulizer solution Take 3 mL (2.5 mg total) by nebulization every 6 (six) hours as needed for wheezing or shortness of breath for up to 20 doses, Starting Sun 4/23/2023, Print      albuterol (Ventolin HFA) 90 mcg/act inhaler Inhale 1-2 puffs every 6 (six) hours as needed for wheezing, Starting Sun 4/23/2023, Print      ascorbic acid (VITAMIN C) 500 MG tablet Take 500 mg by mouth daily, Starting Tue 2/28/2023, Until Wed 2/28/2024, Historical Med      cephalexin (KEFLEX) 500 mg capsule Take 1 capsule (500 mg total) by mouth every 12 (twelve) hours for 7 days, Starting Tue 4/9/2024, Until Tue 4/16/2024, Normal      Cholecalciferol 50 MCG (2000 UT) CAPS Take 1 capsule by mouth daily, Starting Wed 11/16/2022, Until Thu 11/16/2023, Historical Med      ferrous sulfate 325 (65 Fe) mg tablet Take 325 mg by mouth, Starting Tue 2/28/2023, Until Wed 2/28/2024 at 2359, Historical Med      guaiFENesin (ROBITUSSIN) 100 MG/5ML oral liquid Take 5 mL (100 mg total) by mouth 4 (four) times a day as needed for cough for up to 10 doses, Starting Tue 4/18/2023, Print      !! ondansetron (ZOFRAN-ODT) 4 mg disintegrating tablet Take 1 tablet (4 mg total) by mouth every 8 (eight) hours as needed for nausea for up to 10 doses, Starting Tue 4/18/2023, Print      pseudoephedrine (SUDAFED) 60 mg tablet Take 1 tablet (60 mg total) by mouth every 8 (eight) hours as needed for congestion for up to 15 doses, Starting Sun 4/23/2023, Print       !! - Potential duplicate medications found. Please discuss with provider.          No discharge procedures on file.    PDMP Review        None            ED Provider  Electronically Signed by             Jasmeet Dumont MD  04/14/24 0236

## 2024-04-15 LAB — BACTERIA UR CULT: NORMAL

## 2024-04-15 NOTE — DISCHARGE INSTRUCTIONS
Biliary Colic   WHAT YOU NEED TO KNOW:   Biliary colic is severe pain in your upper abdomen caused by a gallbladder problem. Your gallbladder stores bile, which helps break down the fats that you eat.        DISCHARGE INSTRUCTIONS:   Avoid alcohol:  Alcohol can damage your gallbladder and make your symptoms worse.  Eat a variety of healthy foods:  Healthy foods include fruits, vegetables, whole-grain breads, low-fat dairy products, beans, lean meats, and fish. Foods that are high in fiber and low in fat and cholesterol may decrease your symptoms. Ask if you need to be on a special diet.  Exercise:  Ask your healthcare provider about the best exercise plan for you. Exercise may help improve your symptoms.  Follow up with a surgeon as directed.  Contact your healthcare provider if:   You have a fever.    Your pain gets worse, even after you take medicine.    You have nausea or are vomiting.    Your skin or eyes are yellow.    You have questions or concerns about your condition or care.  Return to the emergency department if:   You have severe pain.    You feel like you are going to faint.    You are short of breath.  You have severe vomiting.

## 2024-07-11 ENCOUNTER — HOSPITAL ENCOUNTER (EMERGENCY)
Facility: HOSPITAL | Age: 39
Discharge: HOME | End: 2024-07-11
Attending: INTERNAL MEDICINE
Payer: COMMERCIAL

## 2024-07-11 VITALS
OXYGEN SATURATION: 97 % | SYSTOLIC BLOOD PRESSURE: 137 MMHG | HEART RATE: 75 BPM | DIASTOLIC BLOOD PRESSURE: 67 MMHG | BODY MASS INDEX: 37.28 KG/M2 | RESPIRATION RATE: 18 BRPM | WEIGHT: 245.15 LBS | TEMPERATURE: 98.3 F

## 2024-07-11 DIAGNOSIS — B37.31 VULVOVAGINAL CANDIDIASIS: Primary | ICD-10-CM

## 2024-07-11 LAB
BACTERIA UR QL AUTO: ABNORMAL /HPF
BILIRUB UR QL STRIP: NEGATIVE
CLARITY UR: CLEAR
COLOR UR: YELLOW
GLUCOSE UR STRIP-MCNC: NEGATIVE MG/DL
HGB UR QL STRIP.AUTO: NEGATIVE
KETONES UR STRIP-MCNC: NEGATIVE MG/DL
LEUKOCYTE ESTERASE UR QL STRIP: ABNORMAL
MUCOUS THREADS UR QL AUTO: ABNORMAL
NITRITE UR QL STRIP: NEGATIVE
NON-SQ EPI CELLS URNS QL MICRO: ABNORMAL /HPF
PH UR STRIP.AUTO: 7 [PH] (ref 4.5–8)
PROT UR STRIP-MCNC: NEGATIVE MG/DL
RBC #/AREA URNS AUTO: ABNORMAL /HPF
SP GR UR STRIP.AUTO: 1.02 (ref 1–1.03)
UROBILINOGEN UR QL STRIP.AUTO: 2 E.U./DL
WBC #/AREA URNS AUTO: ABNORMAL /HPF

## 2024-07-11 PROCEDURE — 87660 TRICHOMONAS VAGIN DIR PROBE: CPT | Performed by: PHYSICIAN ASSISTANT

## 2024-07-11 PROCEDURE — 87491 CHLMYD TRACH DNA AMP PROBE: CPT | Performed by: PHYSICIAN ASSISTANT

## 2024-07-11 PROCEDURE — 99284 EMERGENCY DEPT VISIT MOD MDM: CPT | Performed by: PHYSICIAN ASSISTANT

## 2024-07-11 PROCEDURE — 81001 URINALYSIS AUTO W/SCOPE: CPT

## 2024-07-11 PROCEDURE — 87510 GARDNER VAG DNA DIR PROBE: CPT | Performed by: PHYSICIAN ASSISTANT

## 2024-07-11 PROCEDURE — 87480 CANDIDA DNA DIR PROBE: CPT | Performed by: PHYSICIAN ASSISTANT

## 2024-07-11 PROCEDURE — 87086 URINE CULTURE/COLONY COUNT: CPT

## 2024-07-11 PROCEDURE — 87591 N.GONORRHOEAE DNA AMP PROB: CPT | Performed by: PHYSICIAN ASSISTANT

## 2024-07-11 NOTE — ED PROVIDER NOTES
History  Chief Complaint   Patient presents with    Vaginal Pain     Pt is 19 weeks pregnant and started with vaginal itching and burning 3 days ago, reports open sore above vulva.  OB would like pt evaluated in ED     Patient is a 40 y/o female currently 19 wks pregnant (sees OBGYN at [facility]) who presents for evaluation of vaginal Itching and burning. She states symptoms started 3 days ago. She states that she looked at the area today and at the top of her vulva is an open area/abrasion. Had some yellowish mucus discharge x 3 days ago which resolved. Had some mild cramping. She called her OBGYN who states they didn't have an appointment the next two days. She also tried urgent care who couldn't see her. No recent abx. No vaginal bleeding.  Has an US scheduled tomorrow. No new sexual partners. No other rash. No new soaps, lotions, detergents, foods, meds.         Prior to Admission Medications   Prescriptions Last Dose Informant Patient Reported? Taking?   Cholecalciferol 50 MCG (2000 UT) CAPS   Yes No   Sig: Take 1 capsule by mouth daily   acetaminophen (TYLENOL) 500 mg tablet   No No   Sig: Take 1 tablet (500 mg total) by mouth every 6 (six) hours as needed for mild pain or moderate pain   acetaminophen (TYLENOL) 650 mg CR tablet   No No   Sig: Take 1 tablet (650 mg total) by mouth every 8 (eight) hours as needed for mild pain   albuterol (2.5 mg/3 mL) 0.083 % nebulizer solution   No No   Sig: Take 3 mL (2.5 mg total) by nebulization every 6 (six) hours as needed for wheezing or shortness of breath for up to 20 doses   albuterol (Ventolin HFA) 90 mcg/act inhaler   No No   Sig: Inhale 1-2 puffs every 6 (six) hours as needed for wheezing   ascorbic acid (VITAMIN C) 500 MG tablet   Yes No   Sig: Take 500 mg by mouth daily   famotidine (PEPCID) 20 mg tablet   No No   Sig: Take 1 tablet (20 mg total) by mouth 2 (two) times a day   ferrous sulfate 325 (65 Fe) mg tablet   Yes No   Sig: Take 325 mg by mouth   guaiFENesin  (ROBITUSSIN) 100 MG/5ML oral liquid   No No   Sig: Take 5 mL (100 mg total) by mouth 4 (four) times a day as needed for cough for up to 10 doses   metoclopramide (REGLAN) 10 mg tablet   No No   Sig: Take 1 tablet (10 mg total) by mouth 3 (three) times a day as needed (nausea)   ondansetron (ZOFRAN-ODT) 4 mg disintegrating tablet   No No   Sig: Take 1 tablet (4 mg total) by mouth every 8 (eight) hours as needed for nausea for up to 10 doses   ondansetron (ZOFRAN-ODT) 4 mg disintegrating tablet   No No   Sig: Take 1 tablet (4 mg total) by mouth every 6 (six) hours as needed for nausea or vomiting   pseudoephedrine (SUDAFED) 60 mg tablet   No No   Sig: Take 1 tablet (60 mg total) by mouth every 8 (eight) hours as needed for congestion for up to 15 doses      Facility-Administered Medications: None       Past Medical History:   Diagnosis Date    Asthma     Depression        Past Surgical History:   Procedure Laterality Date    SLEEVE GASTROPLASTY         Family History   Problem Relation Age of Onset    Diabetes Mother     Hypertension Mother     Diabetes Father     Hypertension Father     Hyperlipidemia Father     Cancer Maternal Aunt     Cancer Maternal Uncle      I have reviewed and agree with the history as documented.    E-Cigarette/Vaping    E-Cigarette Use Never User      E-Cigarette/Vaping Substances    Nicotine No     THC No     CBD No     Flavoring No     Other No     Unknown No      Social History     Tobacco Use    Smoking status: Never    Smokeless tobacco: Never   Vaping Use    Vaping status: Never Used   Substance Use Topics    Alcohol use: Not Currently     Comment: social    Drug use: Never       Review of Systems   Constitutional:  Negative for chills and fever.   Eyes:  Negative for visual disturbance.   Respiratory:  Negative for shortness of breath.    Cardiovascular:  Negative for chest pain.   Gastrointestinal:  Negative for abdominal pain, diarrhea, nausea and vomiting.   Genitourinary:  Positive  for vaginal discharge. Negative for difficulty urinating, dysuria, flank pain, hematuria and vaginal bleeding.   Musculoskeletal:  Negative for arthralgias and back pain.   Skin:  Negative for color change and rash.   Neurological:  Negative for seizures and syncope.   All other systems reviewed and are negative.      Physical Exam  Physical Exam  Vitals and nursing note reviewed. Exam conducted with a chaperone present.   Constitutional:       General: She is not in acute distress.     Appearance: Normal appearance. She is well-developed. She is not ill-appearing, toxic-appearing or diaphoretic.   HENT:      Head: Normocephalic and atraumatic.      Right Ear: External ear normal.      Left Ear: External ear normal.   Eyes:      Conjunctiva/sclera: Conjunctivae normal.   Cardiovascular:      Rate and Rhythm: Normal rate and regular rhythm.      Heart sounds: Normal heart sounds. No murmur heard.  Pulmonary:      Effort: Pulmonary effort is normal. No respiratory distress.      Breath sounds: Normal breath sounds. No stridor. No wheezing, rhonchi or rales.   Abdominal:      General: Abdomen is flat. Bowel sounds are normal. There is no distension.      Palpations: Abdomen is soft.      Tenderness: There is no abdominal tenderness.   Genitourinary:     Comments: Labia bilaterally with mild erythema/irritation, small superficial excoriation/fissure noted at the top of the vulva without bleeding. There is white discharge noted at the vaginal opening. No other rash or lesions.   Musculoskeletal:         General: No swelling.      Cervical back: Neck supple.   Skin:     General: Skin is warm and dry.      Capillary Refill: Capillary refill takes less than 2 seconds.   Neurological:      Mental Status: She is alert.   Psychiatric:         Mood and Affect: Mood normal.         Vital Signs  ED Triage Vitals [07/11/24 1618]   Temperature Pulse Respirations Blood Pressure SpO2   98.3 °F (36.8 °C) 75 18 137/67 97 %      Temp  Source Heart Rate Source Patient Position - Orthostatic VS BP Location FiO2 (%)   Oral -- -- -- --      Pain Score       --           Vitals:    07/11/24 1618   BP: 137/67   Pulse: 75         Visual Acuity      ED Medications  Medications - No data to display    Diagnostic Studies  Results Reviewed       Procedure Component Value Units Date/Time    VAGINITIS/VAGINOSIS, DNA PROBE [[id number]] Collected: 07/11/24 1917    Lab Status: In process Specimen: Genital from Vaginal Updated: 07/11/24 1920    Narrative:      The following orders were created for panel order VAGINITIS/VAGINOSIS, DNA PROBE.  Procedure                               Abnormality         Status                     ---------                               -----------         ------                     Vaginosis DNA Probe[[id number]]                              In process                   Please view results for these tests on the individual orders.    Vaginosis DNA Probe [[id number]] Collected: 07/11/24 1917    Lab Status: In process Specimen: Genital from Vaginal Updated: 07/11/24 1920    Urine culture [[id number]] Collected: 07/11/24 1816    Lab Status: In process Specimen: Urine, Clean Catch Updated: 07/11/24 1858    Chlamydia/GC amplified DNA by PCR [[id number]] Collected: 07/11/24 1820    Lab Status: In process Specimen: Urine, Other Updated: 07/11/24 1849    Urine Microscopic [[id number]]  (Abnormal) Collected: 07/11/24 1816    Lab Status: Final result Specimen: Urine, Clean Catch Updated: 07/11/24 1840     RBC, UA None Seen /hpf      WBC, UA 1-2 /hpf      Epithelial Cells Occasional /hpf      Bacteria, UA Occasional /hpf      MUCUS THREADS Occasional    Urine Macroscopic, POC [[id number]]  (Abnormal) Collected: 07/11/24 1816    Lab Status: Final result Specimen: Urine Updated: 07/11/24 1818     Color, UA Yellow     Clarity, UA Clear     pH, UA 7.0     Leukocytes, UA Trace     Nitrite, UA Negative     Protein, UA Negative mg/dl      Glucose, UA  Negative mg/dl      Ketones, UA Negative mg/dl      Urobilinogen, UA 2.0 E.U./dl      Bilirubin, UA Negative     Occult Blood, UA Negative     Specific Gravity, UA 1.020    Narrative:      CLINITEK RESULT                   No orders to display              Procedures  Procedures         ED Course                                 SBIRT 20yo+      Flowsheet Row Most Recent Value   Initial Alcohol Screen: US AUDIT-C     1. How often do you have a drink containing alcohol? 0 Filed at: 07/11/2024 1719   2. How many drinks containing alcohol do you have on a typical day you are drinking?  0 Filed at: 07/11/2024 1719   3b. FEMALE Any Age, or MALE 65+: How often do you have 4 or more drinks on one occassion? 0 Filed at: 07/11/2024 1719   Audit-C Score 0 Filed at: 07/11/2024 1719   ALLEN: How many times in the past year have you...    Used an illegal drug or used a prescription medication for non-medical reasons? Never Filed at: 07/11/2024 1719                      Medical Decision Making  Will send UA, GC/Chlamydia, and vaginosis probe. Explained to patient that results can take 3-4 days and she will be contacted with positive results.   .   Suspect yeast infection and plan to treat with monistat   Discussed with OBGYN on call Dr. Atkins- who agrees with plan for treatment empirically with monistat cream. Patient has US scheduled tomorrow and follow up with her OBGYN.   The management plan was discussed in detail with the patient at bedside and all questions were answered.  Prior to discharge, we provided both verbal and written instructions.  We discussed with the patient the signs and symptoms for which to return to the emergency department.  All questions were answered and patient was comfortable with the plan of care and discharged to home.  Instructed the patient to follow up with the primary care provider and/or specialist provided and their written instructions.  The patient verbalized understanding of  our discussion and plan of care, and agrees to return to the Emergency Department for concerns and progression of illness.     At discharge, I instructed the patient to:  -follow up with pcp  -follow up with the recommended specialists  -return to the ER if symptoms worsened or new symptoms arose  Patient agreed to this plan and was stable at time of discharge.     Amount and/or Complexity of Data Reviewed  Labs: ordered. Decision-making details documented in ED Course.    Risk  OTC drugs.                 Disposition  Final diagnoses:   Vulvovaginal candidiasis     Time reflects when diagnosis was documented in both MDM as applicable and the Disposition within this note       Time User Action Codes Description Comment    7/11/2024  7:01 PM Nighat Mitchell Add [B37.31] Vulvovaginal candidiasis           ED Disposition       ED Disposition   Discharge    Condition   Stable    Date/Time   Thu Jul 11, 2024 1901    Comment   Serina eMrrill discharge to home/self care.                   Follow-up Information       Follow up With Specialties Details Why Contact Info Additional Information    Follow up with your OBGYN in 1 day.         [facility] Emergency Department Emergency Medicine  If symptoms worsen [address] [facility] [address]  [phone] [facility] Emergency Department, [address], [place], [address]            Discharge Medication List as of 7/11/2024  7:03 PM        START taking these medications    Details   miconazole (MONISTAT-7) 2 % vaginal cream Insert 1 applicator into the vagina daily at bedtime X 7 days, Starting u 7/11/2024, Normal           CONTINUE these medications which have NOT CHANGED    Details   acetaminophen (TYLENOL) 500 mg tablet Take 1 tablet (500 mg total) by mouth every 6 (six) hours as needed for mild pain or moderate pain, Starting Sun 11/27/2022, Print      acetaminophen (TYLENOL) 650 mg CR tablet Take 1 tablet (650 mg  total) by mouth every 8 (eight) hours as needed for mild pain, Starting Tue 4/9/2024, Normal      albuterol (2.5 mg/3 mL) 0.083 % nebulizer solution Take 3 mL (2.5 mg total) by nebulization every 6 (six) hours as needed for wheezing or shortness of breath for up to 20 doses, Starting Sun 4/23/2023, Print      albuterol (Ventolin HFA) 90 mcg/act inhaler Inhale 1-2 puffs every 6 (six) hours as needed for wheezing, Starting Sun 4/23/2023, Print      ascorbic acid (VITAMIN C) 500 MG tablet Take 500 mg by mouth daily, Starting Tue 2/28/2023, Until Wed 2/28/2024, Historical Med      Cholecalciferol 50 MCG (2000 UT) CAPS Take 1 capsule by mouth daily, Starting Wed 11/16/2022, Until Thu 11/16/2023, Historical Med      famotidine (PEPCID) 20 mg tablet Take 1 tablet (20 mg total) by mouth 2 (two) times a day, Starting Sun 4/14/2024, Normal      ferrous sulfate 325 (65 Fe) mg tablet Take 325 mg by mouth, Starting Tue 2/28/2023, Until Wed 2/28/2024 at 2359, Historical Med      guaiFENesin (ROBITUSSIN) 100 MG/5ML oral liquid Take 5 mL (100 mg total) by mouth 4 (four) times a day as needed for cough for up to 10 doses, Starting Tue 4/18/2023, Print      metoclopramide (REGLAN) 10 mg tablet Take 1 tablet (10 mg total) by mouth 3 (three) times a day as needed (nausea), Starting Sun 4/14/2024, Normal      !! ondansetron (ZOFRAN-ODT) 4 mg disintegrating tablet Take 1 tablet (4 mg total) by mouth every 8 (eight) hours as needed for nausea for up to 10 doses, Starting Tue 4/18/2023, Print      !! ondansetron (ZOFRAN-ODT) 4 mg disintegrating tablet Take 1 tablet (4 mg total) by mouth every 6 (six) hours as needed for nausea or vomiting, Starting Sun 4/14/2024, Normal      pseudoephedrine (SUDAFED) 60 mg tablet Take 1 tablet (60 mg total) by mouth every 8 (eight) hours as needed for congestion for up to 15 doses, Starting Sun 4/23/2023, Print       !! - Potential duplicate medications found. Please discuss with provider.          No  discharge procedures on file.    PDMP Review       None            ED Provider  Electronically Signed by             Nighat Mitchell PA-C  07/12/24 0005

## 2024-07-12 LAB
C TRACH DNA SPEC QL NAA+PROBE: NEGATIVE
CANDIDA RRNA VAG QL PROBE: DETECTED
G VAGINALIS RRNA GENITAL QL PROBE: NOT DETECTED
N GONORRHOEA DNA SPEC QL NAA+PROBE: NEGATIVE
T VAGINALIS RRNA GENITAL QL PROBE: NOT DETECTED

## 2024-07-13 LAB — BACTERIA UR CULT: NORMAL

## 2025-03-20 ENCOUNTER — APPOINTMENT (EMERGENCY)
Dept: RADIOLOGY | Facility: HOSPITAL | Age: 40
End: 2025-03-20
Payer: COMMERCIAL

## 2025-03-20 ENCOUNTER — HOSPITAL ENCOUNTER (EMERGENCY)
Facility: HOSPITAL | Age: 40
Discharge: HOME | End: 2025-03-20
Attending: EMERGENCY MEDICINE
Payer: COMMERCIAL

## 2025-03-20 VITALS
BODY MASS INDEX: 39.74 KG/M2 | WEIGHT: 268.3 LBS | HEART RATE: 71 BPM | TEMPERATURE: 98.4 F | SYSTOLIC BLOOD PRESSURE: 114 MMHG | RESPIRATION RATE: 20 BRPM | DIASTOLIC BLOOD PRESSURE: 70 MMHG | HEIGHT: 69 IN | OXYGEN SATURATION: 98 %

## 2025-03-20 DIAGNOSIS — R07.89 CHEST WALL PAIN: Primary | ICD-10-CM

## 2025-03-20 LAB
ATRIAL RATE: 69 BPM
EXT PREGNANCY TEST URINE: NEGATIVE
EXT. CONTROL: NORMAL
P AXIS: 33 DEGREES
PR INTERVAL: 160 MS
QRS AXIS: 22 DEGREES
QRSD INTERVAL: 76 MS
QT INTERVAL: 402 MS
QTC INTERVAL: 430 MS
T WAVE AXIS: 6 DEGREES
VENTRICULAR RATE: 69 BPM

## 2025-03-20 PROCEDURE — 71046 X-RAY EXAM CHEST 2 VIEWS: CPT

## 2025-03-20 PROCEDURE — 81025 URINE PREGNANCY TEST: CPT

## 2025-03-20 PROCEDURE — 93005 ELECTROCARDIOGRAM TRACING: CPT

## 2025-03-20 PROCEDURE — 93010 ELECTROCARDIOGRAM REPORT: CPT | Performed by: INTERNAL MEDICINE

## 2025-03-20 PROCEDURE — 96374 THER/PROPH/DIAG INJ IV PUSH: CPT

## 2025-03-20 PROCEDURE — 99285 EMERGENCY DEPT VISIT HI MDM: CPT

## 2025-03-20 PROCEDURE — 99284 EMERGENCY DEPT VISIT MOD MDM: CPT | Performed by: EMERGENCY MEDICINE

## 2025-03-20 RX ORDER — ACETAMINOPHEN 325 MG/1
975 TABLET ORAL ONCE
Status: COMPLETED | OUTPATIENT
Start: 2025-03-20 | End: 2025-03-20

## 2025-03-20 RX ORDER — KETOROLAC TROMETHAMINE 30 MG/ML
15 INJECTION, SOLUTION INTRAMUSCULAR; INTRAVENOUS ONCE
Status: COMPLETED | OUTPATIENT
Start: 2025-03-20 | End: 2025-03-20

## 2025-03-20 RX ORDER — KETOROLAC TROMETHAMINE 30 MG/ML
15 INJECTION, SOLUTION INTRAMUSCULAR; INTRAVENOUS ONCE
Status: DISCONTINUED | OUTPATIENT
Start: 2025-03-20 | End: 2025-03-20

## 2025-03-20 RX ADMIN — KETOROLAC TROMETHAMINE 15 MG: 30 INJECTION, SOLUTION INTRAMUSCULAR; INTRAVENOUS at 20:56

## 2025-03-20 RX ADMIN — ACETAMINOPHEN 975 MG: 325 TABLET, FILM COATED ORAL at 20:53

## 2025-03-21 NOTE — ED ATTENDING ATTESTATION
3/20/2025  IKarthik MD, saw and evaluated the patient. I have discussed the patient with the resident/non-physician practitioner and agree with the resident's/non-physician practitioner's findings, Plan of Care, and MDM as documented in the resident's/non-physician practitioner's note, except where noted. All available labs and Radiology studies were reviewed.  I was present for key portions of any procedure(s) performed by the resident/non-physician practitioner and I was immediately available to provide assistance.       At this point I agree with the current assessment done in the Emergency Department.  I have conducted an independent evaluation of this patient a history and physical is as follows:  Patient is a 41 yo female, hx of GERD, c/o chest pain 2 hours prior to arrival, passenger in the car, pain described as sharp over left anterior aspect of the chest, positional worse with movement and taking a deep breath, no SOB, no fever, cough, abdominal pain, back pain, leg swelling. On exam, VSS, Lungs CTA, reproducible tenderness over anterior chest, CVS RRR, Abd soft, NTND, Neuro non-focal. Impression: Atypical reproducible chest pain, worse with movement, possible musculoskeletal pain, GERD, patient without cardiovascular risk factors, patient also reports psychological stress, we will check EKG, CXR rule out pneumothorax, treat symptomatically for pain.    ED Course     EKG, chest x-ray independent reviewed, no significant acute normality.  Likely musculoskeletal/costochondritis, stable for discharge with symptomatic treatment with OTC pain meds.    Critical Care Time  Procedures

## 2025-03-21 NOTE — ED PROVIDER NOTES
Time reflects when diagnosis was documented in both MDM as applicable and the Disposition within this note       Time User Action Codes Description Comment    3/20/2025  9:32 PM Germán Cruz Add [R07.89] Chest wall pain           ED Disposition       ED Disposition   Discharge    Condition   Stable    Date/Time   u Mar 20, 2025  9:32 PM    Comment   Serina Merrill discharge to home/self care.                   Assessment & Plan       Medical Decision Making  Patient is a 40-year-old female presenting for evaluation of chest pain    Differential: Likely musculoskeletal, could be esophageal spasm, doubt ACS pneumothorax myocarditis/pericarditis    Plan: EKG chest x-ray urine pregnancy symptomatic treatment monitor and reassess.  No need for labs at this time    See ED course    Chest x-ray normal    Patient is hemodynamically stable and cleared for discharge with outpatient follow-up.  Return precautions given patient verbalized understanding    Amount and/or Complexity of Data Reviewed  Labs: ordered.  Radiology: ordered and independent interpretation performed.  ECG/medicine tests:  Decision-making details documented in ED Course.    Risk  OTC drugs.  Prescription drug management.        ED Course as of 03/22/25 1228   Thu Mar 20, 2025   2050 ECG 12 lead  Normal sinus rhythm 69 no ischemic changes no change from prior       Medications   acetaminophen (TYLENOL) tablet 975 mg (975 mg Oral Given 3/20/25 2053)   ketorolac (TORADOL) injection 15 mg (15 mg Intravenous Given 3/20/25 2056)       ED Risk Strat Scores                            SBIRT 20yo+      Flowsheet Row Most Recent Value   Initial Alcohol Screen: US AUDIT-C     1. How often do you have a drink containing alcohol? 2 Filed at: 03/20/2025 2023   2. How many drinks containing alcohol do you have on a typical day you are drinking?  1 Filed at: 03/20/2025 2023   3b. FEMALE Any Age, or MALE 65+: How often do you have 4 or more drinks on one occassion? 0  Filed at: 03/20/2025 2023   Audit-C Score 3 Filed at: 03/20/2025 2023   ALLEN: How many times in the past year have you...    Used an illegal drug or used a prescription medication for non-medical reasons? Never Filed at: 03/20/2025 2023                            History of Present Illness       Chief Complaint   Patient presents with    Chest Pain     Patient coming in with sharp chest pain radiating to the back starting 2 hours ago. Reports it's hard to breathe. Denies cardiac history. No meds PTA. Pt reports difficulty turning and moving her neck.       Past Medical History:   Diagnosis Date    Asthma     Depression       Past Surgical History:   Procedure Laterality Date    SLEEVE GASTROPLASTY        Family History   Problem Relation Age of Onset    Diabetes Mother     Hypertension Mother     Diabetes Father     Hypertension Father     Hyperlipidemia Father     Cancer Maternal Aunt     Cancer Maternal Uncle       Social History     Tobacco Use    Smoking status: Never    Smokeless tobacco: Never   Vaping Use    Vaping status: Never Used   Substance Use Topics    Alcohol use: Not Currently     Comment: social    Drug use: Never      E-Cigarette/Vaping    E-Cigarette Use Never User       E-Cigarette/Vaping Substances    Nicotine No     THC No     CBD No     Flavoring No     Other No     Unknown No       I have reviewed and agree with the history as documented.     Patient is a 40-year-old female with past medical history presented to the ED for evaluation of chest pain.  Reports that symptoms began acutely approximately 2 hours ago while driving.  States that it is a centralized chest pain that is nonradiating, is positional and worse with deep inspiration.  Nonexertional.  Denies fever chills URI symptoms palpitation shortness of breath cough abdominal pain nausea vomiting neck pain back pain or any other complaints at this time.  Of note states that she had an episode of reflux last night which she has had in  the past before, she does take Pepcid and omeprazole intermittently at home for this          Review of Systems   Constitutional:  Negative for chills and fever.   HENT:  Negative for congestion.    Respiratory:  Negative for cough and shortness of breath.    Cardiovascular:  Positive for chest pain. Negative for palpitations and leg swelling.   Gastrointestinal:  Negative for abdominal pain, nausea and vomiting.   Genitourinary:  Negative for dysuria, frequency, hematuria, vaginal bleeding and vaginal discharge.   Musculoskeletal:  Negative for back pain and neck pain.   Skin:  Negative for rash.   Neurological:  Negative for syncope, weakness, numbness and headaches.   All other systems reviewed and are negative.          Objective       ED Triage Vitals [03/20/25 2024]   Temperature Pulse Blood Pressure Respirations SpO2 Patient Position - Orthostatic VS   98.4 °F (36.9 °C) 71 114/70 20 98 % Lying      Temp Source Heart Rate Source BP Location FiO2 (%) Pain Score    Oral Monitor Right arm -- 10 - Worst Possible Pain      Vitals      Date and Time Temp Pulse SpO2 Resp BP Pain Score FACES Pain Rating User   03/20/25 2150 -- -- -- -- -- 7 -- SH   03/20/25 2053 -- -- -- -- -- 10 - Worst Possible Pain --    03/20/25 2024 98.4 °F (36.9 °C) 71 98 % 20 114/70 10 - Worst Possible Pain -- VF            Physical Exam  Vitals and nursing note reviewed.   Constitutional:       General: She is not in acute distress.     Appearance: She is well-developed. She is not ill-appearing.   HENT:      Head: Normocephalic and atraumatic.      Mouth/Throat:      Mouth: Mucous membranes are moist.   Eyes:      Extraocular Movements: Extraocular movements intact.      Conjunctiva/sclera: Conjunctivae normal.   Cardiovascular:      Rate and Rhythm: Normal rate and regular rhythm.      Heart sounds: No murmur heard.  Pulmonary:      Effort: Pulmonary effort is normal. No respiratory distress.      Breath sounds: Normal breath sounds.    Chest:      Comments: Patient with tenderness to palpation along the upper anterior chest wall bilaterally.  No crepitus or edema  Abdominal:      Palpations: Abdomen is soft.      Tenderness: There is no abdominal tenderness.   Musculoskeletal:         General: Normal range of motion.      Cervical back: Normal range of motion and neck supple.      Right lower leg: No edema.      Left lower leg: No edema.   Skin:     General: Skin is warm and dry.      Capillary Refill: Capillary refill takes less than 2 seconds.   Neurological:      Mental Status: She is alert.         Results Reviewed       Procedure Component Value Units Date/Time    POCT pregnancy, urine [[id number]]  (Normal) Collected: 03/20/25 2053    Lab Status: Final result Updated: 03/20/25 2053     EXT Preg Test, Ur Negative     Control Valid            XR chest 2 views   ED Interpretation by Germán Cruz DO (03/20 2132)   Wet read-no acute cardiopulmonary disease no pneumothorax      Final Interpretation by Donald Stewart MD (03/21 0941)      No acute cardiopulmonary disease.            Workstation performed: [id number]             Procedures    ED Medication and Procedure Management   Prior to Admission Medications   Prescriptions Last Dose Informant Patient Reported? Taking?   Cholecalciferol 50 MCG (2000 UT) CAPS   Yes No   Sig: Take 1 capsule by mouth daily   acetaminophen (TYLENOL) 500 mg tablet   No No   Sig: Take 1 tablet (500 mg total) by mouth every 6 (six) hours as needed for mild pain or moderate pain   acetaminophen (TYLENOL) 650 mg CR tablet   No No   Sig: Take 1 tablet (650 mg total) by mouth every 8 (eight) hours as needed for mild pain   albuterol (2.5 mg/3 mL) 0.083 % nebulizer solution   No No   Sig: Take 3 mL (2.5 mg total) by nebulization every 6 (six) hours as needed for wheezing or shortness of breath for up to 20 doses   albuterol (Ventolin HFA) 90 mcg/act inhaler   No No   Sig: Inhale 1-2 puffs every 6 (six) hours as  needed for wheezing   ascorbic acid (VITAMIN C) 500 MG tablet   Yes No   Sig: Take 500 mg by mouth daily   famotidine (PEPCID) 20 mg tablet   No No   Sig: Take 1 tablet (20 mg total) by mouth 2 (two) times a day   ferrous sulfate 325 (65 Fe) mg tablet   Yes No   Sig: Take 325 mg by mouth   guaiFENesin (ROBITUSSIN) 100 MG/5ML oral liquid   No No   Sig: Take 5 mL (100 mg total) by mouth 4 (four) times a day as needed for cough for up to 10 doses   metoclopramide (REGLAN) 10 mg tablet   No No   Sig: Take 1 tablet (10 mg total) by mouth 3 (three) times a day as needed (nausea)   miconazole (MONISTAT-7) 2 % vaginal cream   No No   Sig: Insert 1 applicator into the vagina daily at bedtime X 7 days   ondansetron (ZOFRAN-ODT) 4 mg disintegrating tablet   No No   Sig: Take 1 tablet (4 mg total) by mouth every 8 (eight) hours as needed for nausea for up to 10 doses   ondansetron (ZOFRAN-ODT) 4 mg disintegrating tablet   No No   Sig: Take 1 tablet (4 mg total) by mouth every 6 (six) hours as needed for nausea or vomiting   pseudoephedrine (SUDAFED) 60 mg tablet   No No   Sig: Take 1 tablet (60 mg total) by mouth every 8 (eight) hours as needed for congestion for up to 15 doses      Facility-Administered Medications: None     Discharge Medication List as of 3/20/2025  9:33 PM        CONTINUE these medications which have NOT CHANGED    Details   acetaminophen (TYLENOL) 500 mg tablet Take 1 tablet (500 mg total) by mouth every 6 (six) hours as needed for mild pain or moderate pain, Starting Sun 11/27/2022, Print      acetaminophen (TYLENOL) 650 mg CR tablet Take 1 tablet (650 mg total) by mouth every 8 (eight) hours as needed for mild pain, Starting Tue 4/9/2024, Normal      albuterol (2.5 mg/3 mL) 0.083 % nebulizer solution Take 3 mL (2.5 mg total) by nebulization every 6 (six) hours as needed for wheezing or shortness of breath for up to 20 doses, Starting Sun 4/23/2023, Print      albuterol (Ventolin HFA) 90 mcg/act inhaler  Inhale 1-2 puffs every 6 (six) hours as needed for wheezing, Starting Sun 4/23/2023, Print      ascorbic acid (VITAMIN C) 500 MG tablet Take 500 mg by mouth daily, Starting Tue 2/28/2023, Until Wed 2/28/2024, Historical Med      Cholecalciferol 50 MCG (2000 UT) CAPS Take 1 capsule by mouth daily, Starting Wed 11/16/2022, Until Thu 11/16/2023, Historical Med      famotidine (PEPCID) 20 mg tablet Take 1 tablet (20 mg total) by mouth 2 (two) times a day, Starting Sun 4/14/2024, Normal      ferrous sulfate 325 (65 Fe) mg tablet Take 325 mg by mouth, Starting Tue 2/28/2023, Until Wed 2/28/2024 at 2359, Historical Med      guaiFENesin (ROBITUSSIN) 100 MG/5ML oral liquid Take 5 mL (100 mg total) by mouth 4 (four) times a day as needed for cough for up to 10 doses, Starting Tue 4/18/2023, Print      metoclopramide (REGLAN) 10 mg tablet Take 1 tablet (10 mg total) by mouth 3 (three) times a day as needed (nausea), Starting Sun 4/14/2024, Normal      miconazole (MONISTAT-7) 2 % vaginal cream Insert 1 applicator into the vagina daily at bedtime X 7 days, Starting Thu 7/11/2024, Normal      !! ondansetron (ZOFRAN-ODT) 4 mg disintegrating tablet Take 1 tablet (4 mg total) by mouth every 8 (eight) hours as needed for nausea for up to 10 doses, Starting Tue 4/18/2023, Print      !! ondansetron (ZOFRAN-ODT) 4 mg disintegrating tablet Take 1 tablet (4 mg total) by mouth every 6 (six) hours as needed for nausea or vomiting, Starting Sun 4/14/2024, Normal      pseudoephedrine (SUDAFED) 60 mg tablet Take 1 tablet (60 mg total) by mouth every 8 (eight) hours as needed for congestion for up to 15 doses, Starting Sun 4/23/2023, Print       !! - Potential duplicate medications found. Please discuss with provider.        No discharge procedures on file.  ED SEPSIS DOCUMENTATION   Time reflects when diagnosis was documented in both MDM as applicable and the Disposition within this note       Time User Action Codes Description Comment     3/20/2025  9:32 PM Germán Cruz Add [R07.89] Chest wall pain                  Germán Cruz,   03/22/25 1231

## 2025-03-21 NOTE — ED NOTES
Pt ambulatory to bathroom with steady gait and minimal assist.     Shalom Polk RN  03/20/25 9950